# Patient Record
Sex: FEMALE | Race: AMERICAN INDIAN OR ALASKA NATIVE | NOT HISPANIC OR LATINO | Employment: PART TIME | URBAN - METROPOLITAN AREA
[De-identification: names, ages, dates, MRNs, and addresses within clinical notes are randomized per-mention and may not be internally consistent; named-entity substitution may affect disease eponyms.]

---

## 2021-05-12 ENCOUNTER — HOSPITAL ENCOUNTER (EMERGENCY)
Facility: CLINIC | Age: 26
Discharge: HOME | End: 2021-05-12
Attending: EMERGENCY MEDICINE | Admitting: EMERGENCY MEDICINE
Payer: COMMERCIAL

## 2021-05-12 VITALS
TEMPERATURE: 98.9 F | RESPIRATION RATE: 18 BRPM | BODY MASS INDEX: 27.58 KG/M2 | HEART RATE: 66 BPM | HEIGHT: 69 IN | OXYGEN SATURATION: 99 % | SYSTOLIC BLOOD PRESSURE: 122 MMHG | WEIGHT: 186.2 LBS | DIASTOLIC BLOOD PRESSURE: 75 MMHG

## 2021-05-12 DIAGNOSIS — F33.1 MODERATE EPISODE OF RECURRENT MAJOR DEPRESSIVE DISORDER (H): ICD-10-CM

## 2021-05-12 LAB
AMPHETAMINES UR QL SCN: NEGATIVE
BARBITURATES UR QL: NEGATIVE
BENZODIAZ UR QL: NEGATIVE
CANNABINOIDS UR QL SCN: NEGATIVE
COCAINE UR QL: NEGATIVE
HCG UR QL: NEGATIVE
LABORATORY COMMENT REPORT: NORMAL
OPIATES UR QL SCN: NEGATIVE
PCP UR QL SCN: NEGATIVE
SARS-COV-2 RNA RESP QL NAA+PROBE: NEGATIVE
SPECIMEN SOURCE: NORMAL

## 2021-05-12 PROCEDURE — 80307 DRUG TEST PRSMV CHEM ANLYZR: CPT | Performed by: EMERGENCY MEDICINE

## 2021-05-12 PROCEDURE — 87635 SARS-COV-2 COVID-19 AMP PRB: CPT | Performed by: EMERGENCY MEDICINE

## 2021-05-12 PROCEDURE — C9803 HOPD COVID-19 SPEC COLLECT: HCPCS

## 2021-05-12 PROCEDURE — 90791 PSYCH DIAGNOSTIC EVALUATION: CPT

## 2021-05-12 PROCEDURE — 99204 OFFICE O/P NEW MOD 45 MIN: CPT | Performed by: PSYCHIATRY & NEUROLOGY

## 2021-05-12 PROCEDURE — 81025 URINE PREGNANCY TEST: CPT | Performed by: EMERGENCY MEDICINE

## 2021-05-12 PROCEDURE — 99285 EMERGENCY DEPT VISIT HI MDM: CPT | Mod: 25

## 2021-05-12 RX ORDER — BUPROPION HYDROCHLORIDE 150 MG/1
150 TABLET ORAL EVERY MORNING
COMMUNITY
End: 2021-05-12

## 2021-05-12 RX ORDER — BUPROPION HYDROCHLORIDE 150 MG/1
300 TABLET ORAL EVERY MORNING
Refills: 0 | Status: ON HOLD
Start: 2021-05-12 | End: 2024-06-07

## 2021-05-12 RX ORDER — CITALOPRAM HYDROBROMIDE 20 MG/1
30 TABLET ORAL DAILY
COMMUNITY

## 2021-05-12 ASSESSMENT — ENCOUNTER SYMPTOMS
ABDOMINAL PAIN: 0
DYSPHORIC MOOD: 1
SLEEP DISTURBANCE: 1
SHORTNESS OF BREATH: 0
FEVER: 0

## 2021-05-12 ASSESSMENT — MIFFLIN-ST. JEOR
SCORE: 1675.29
SCORE: 1648.98

## 2021-05-12 NOTE — PLAN OF CARE
"25 year old female received from ED due to worsening depression over the last 4 days and suicidal ideation with no specific plan. Patient had spoke with her therapist this morning who she sees biweekly and it was recommended that she come to the ED for further evaluation on [facility] unit. Reports \"Every year around Mother's Day it is triggering for me because my mom passed away on Mother's day and today is the 14 th anniversary\". Patient also states, \"My  and I recently got back from [place] and I had stopped taking my medication because I am not suppose to drink on them\". Pt reports taking Citalopram 10 mg daily and Wellbutrin. Pt could not recall dosage. Currently is endorsing SI, but does not want to act on passive thoughts. Denies HI and hallucinations. Pt states, \"I just don't want to be here only because I want the thoughts to stop\". Previous MH history includes depression and anxiety since she was a teenager. Patient states, \"I feel like this about 4 times a year\". No hx of previous hospitalization. No hx of previous suicide attempt. Patient presents with flat,blunted, sad affect, and depressed in mood.     Nursing and risk assessments completed. Assessments reviewed with LMHP and physician. Video monitoring in progress, patient informed.  Admission information reviewed with patient. Patient given a tour of [facility] and instructions on using the facility. Questions regarding [facility] addressed. Pt search completed and belongings inventoried.   "

## 2021-05-12 NOTE — ED PROVIDER NOTES
"History     Chief Complaint:  Suicidal       The history is provided by the patient.     Kasey Carrera is a 25 year old female with a history of depression who presents for evaluation of suicidal ideation. The patient has been having worsening depression over the past few days as today is the 14th anniversary of her mother's passing. She has suicidal ideation, but has no plan. She shares that \"she doesn't want to feel like this\", which is what ultimately led her to present. The patient does see a therapist biweekly and spoke to her therapist today regarding her symptoms, who recommend she present. She is able to sleep, but typically wakes up during the night. She has episodes approximately four times per year where she is more depressed and has suicidal ideation. She has never done anything to harm herself. She is on a maintenance dose of citalopram and was started on Wellbutrin one month ago which she feels has helped with her energy levels. Has never tried to harm self. No medical concerns.    Review of Systems   Psychiatric/Behavioral: Positive for dysphoric mood and suicidal ideas. Negative for self-injury.   All other systems reviewed and are negative.    Allergies:  Amoxicillin   Fluoxetine  Sertraline     Medications:   Wellbutrin   Citalopram     Medical History:   Depression     Social History:  The patient was accompanied to the ED by her .  Alcohol Use: Yes  Drug Use: No   Marital Status:     Physical Exam     Patient Vitals for the past 24 hrs:   BP Temp Temp src Pulse Resp SpO2 Height Weight   05/12/21 1419 (!) 151/81 97.7  F (36.5  C) Temporal 81 18 99 % 1.753 m (5' 9\") 87.1 kg (192 lb)        Physical Exam  General: Sitting up in bed  Eyes:  The pupils are equal and round    Conjunctivae and sclerae are normal  ENT:    Wearing a mask  Neck:  Normal range of motion  CV:  Regular rate, regular rhythm     Skin warm and well perfused   Resp:  Non labored breathing on room air    No " tachypnea    No cough heard  MS:  Normal muscular tone  Skin:  No rash or acute skin lesions noted  Neuro:   Awake, alert.      Speech is normal and fluent.    Face is symmetric.     Moves all extremities equally  Psych: Good eye contact, flat affect    Emergency Department Course     Laboratory:    Asymptomatic COVID-19 (Coronavirus) PCR by Nasopharyngeal Swab: Negative     HCG Qualitative Urine: Pending   Drug abuse screen 77 urine: Pending      Emergency Department Course:    Reviewed:  I reviewed the patient's nursing notes, vitals.     Assessments:  1510 I obtained history and performed an exam of the patient, as documented above.     Disposition:  The patient was transferred to [facility].     Impression & Plan     Medical Decision Making:  Kasey Carrera is a 25 year old female who presented to the ED with suicidal ideation. Patient with passive suicidal ideation, worsened recently with anniversary of mother's death today. No attempts at harming self. [facility] Covid negative. Disposition determined by [facility] provider.    Covid-19  Kasey Carrera was evaluated during a global COVID-19 pandemic, which necessitated consideration that the patient might be at risk for infection with the SARS-CoV-2 virus that causes COVID-19.   Applicable protocols for evaluation were followed during the patient's care.   COVID-19 was considered as part of the patient's evaluation. The plan for testing is:  a test was obtained during this visit.    Diagnosis:     ICD-10-CM    1. Depression, unspecified depression type  F32.9 HCG qualitative urine     Drug abuse screen urine   2. Suicidal ideation  R45.851      Scribe Disclosure:  I, Allison Angulo, am serving as a scribe at 2:26 PM on 5/12/2021 to document services personally performed by Mamie Ramsey MD based on my observations and the provider's statements to me.      Mamie Ramsey MD  05/12/21 [id number]

## 2021-05-12 NOTE — ED PROVIDER NOTES
"ED Psychiatric [facility] Note  [facility] Emergency Department - [facility] Unit    Kasey Carrera MRN: [id number]   Age: 25 year old YOB: 1995     History     Chief Complaint   Patient presents with     Suicidal     25-year-old woman with history of depression. Reports that her mother  14 years ago on Mother's Day from cancer, so the anniversary of her death is a particularly difficult time for her. The patient reports that she is taking citalopram 20 mg and bupropion 150 mg. She has been taking the citalopram for about 6 months and started the bupropion about a month ago. She has an appointment to see her provider on Friday where they planned to discuss the dosing of the bupropion. She also plans to remove an implanted birth control device as she and her  plan to start trying to have a child. Patient reports worsening depression, poor sleep, hopelessness, thoughts of not wanting to go on. She denies thoughts of wanting to kill herself.    The history is provided by the patient. No  was used.          Review of Systems   Constitutional: Negative for fever.   Respiratory: Negative for shortness of breath.    Cardiovascular: Negative for chest pain.   Gastrointestinal: Negative for abdominal pain.   Psychiatric/Behavioral: Positive for dysphoric mood and sleep disturbance. Negative for suicidal ideas.   All other systems reviewed and are negative.        Physical Examination   BP: (!) 151/81  Pulse: 81  Temp: 97.7  F (36.5  C)  Resp: 18  Height: 175.3 cm (5' 9\")  Weight: 87.1 kg (192 lb)  SpO2: 99 %    Physical Exam  Vitals signs and nursing note reviewed.   Constitutional:       Appearance: Normal appearance. She is normal weight.   HENT:      Head: Normocephalic and atraumatic.   Eyes:      Extraocular Movements: Extraocular movements intact.   Neck:      Musculoskeletal: Normal range of motion.   Pulmonary:      Effort: Pulmonary effort is normal.   Musculoskeletal: Normal " range of motion.   Skin:     General: Skin is dry.   Neurological:      General: No focal deficit present.      Mental Status: She is alert and oriented to person, place, and time.           Psychiatric Examination   Appearance: awake, alert, adequately groomed and casually dressed  Attitude:  cooperative  Eye Contact:  poor   Mood:  depressed  Affect:  mood congruent  Speech:  clear, coherent and normal prosody  Psychomotor Behavior:  no evidence of tardive dyskinesia, dystonia, or tics and intact station, gait and muscle tone  Throught Process:  linear and goal oriented  Associations:  no loose associations  Thought Content:  passive suicidal ideation present, no auditory hallucinations present and no visual hallucinations present  Insight:  fair  Judgement:  fair  Oriented to:  time, person, and place  Attention Span and Concentration:  intact  Recent and Remote Memory:  intact    ED Course        Labs Ordered and Resulted from Time of ED Arrival Up to the Time of Departure from the ED   SARS-COV-2 (COVID-19) VIRUS RT-PCR   HCG QUALITATIVE URINE   DRUG ABUSE SCREEN 77 URINE (FL, RH, SH)       Assessments & Plan (with Medical Decision Making)   Patient presenting with worsening depression and thoughts that she doesn't want to go on. She denies active thoughts to harm herself. She has an appointment with her provider on Friday. She is taking citalopram 20 and bupropion extended release 150 mg. She sees a therapist every other week. Patient is agreeable to increase in bupropion to 300 mg daily. Per review of electronic records, patient had side effects to sertraline and fluoxetine, so will defer management of selective serotonin reuptake inhibitor to outpatient provider. Patient may take diphenhydramine as directed on package for insomnia as needed. Nursing notes reviewed.    I have reviewed the DEC assessment complete by Kathy Ingram dated 5/12/2021.    Preliminary diagnosis:  Major depressive disorder, recurrent,  moderate    --  Daniel Mendes MD   [facility] EMERGENCY DEPT  EmPATH Unit  5/12/2021        Daniel Mendes MD  05/12/21 5470

## 2021-05-12 NOTE — DISCHARGE INSTRUCTIONS
Increase bupropion to 300 mg daily. Follow up with primary provider on Friday. Increase frequency of therapy visits from every other week to weekly. Follow up with new psychiatric NP on May 19th.    If I am feeling unsafe or I am in a crisis, I will:  Contact my established care providers  Call the National Suicide Prevention Lifeline: [phone]  Go to the nearest emergency room  Call 911    Warning signs that I or other people might notice when a crisis is developing for me:     I stop functioning and struggle to focus.  I feel like a fog is over me.  I start to feel more sad and down.       Things I am able to do on my own to cope or help me feel better:     Going outside.  Walking with my dogs.  Listening to uplifting music.  Exercise.    Things that I am able to do with others to cope or help me better:    Face time or go to coffee with friends.  Talk to my , Wilson.    Changes I can make to support my mental health and wellness:     Increase my wellbutrin.  Stay on my medications; take them daily.   See Psychiatric Nurse Practitioner.  Be intentional about my mental health just like my physical health.  Go to therapy weekly.  Consider a support group.  Consider EMDR or other trauma focused therapy.  Try  Benedryl, melatonin or relaxing sounds for sleep     People in my life that I can ask for help:     Wilson, friends and family.    Your [facility] has a mental health crisis team you can call 24/7:   [facility] Crisis Line Number: [phone]    Follow-Up Plans and Providers:     [facility]:  May 19, 2021 at 10:30 am  [phone]
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
## 2021-05-13 NOTE — PROGRESS NOTES
Patient is pleasant, calm and cooperative. Patient agreeable to discharge plan. Discharge instructions reviewed with patient including follow-up care plan. Medications: Wellbutrin increased to 300 mg daily. Reviewed safety plan and outpatient resources. Denies SI and HI. All belongings that were brought into the hospital have been returned to patient. Escorted off the unit at 1925 accompanied by [facility] staff. Discharged to home via .

## 2023-10-03 ENCOUNTER — NURSE TRIAGE (OUTPATIENT)
Dept: NURSING | Facility: CLINIC | Age: 28
End: 2023-10-03
Payer: COMMERCIAL

## 2023-10-03 NOTE — TELEPHONE ENCOUNTER
Pt calling with concerns about;    HSG procedure done on 9/25/23 ([facility])  Pt reports she is not feeling confident with facility that did her procedure and wants to be triaged today.  Still having cramping 4/10 that is constant today and uncomfortable  Pt is on day 19 of menstrual cycle and does not believe she is pregnant.  Still having minimal amount of drainage from procedure  Bloating in lower abdominal  Some nausea  Also some diarrhea for last few days   Pt states she is worried about infection of procedure but 'does not want to go back to [facility]'    Denies;  Fever  Vomiting  Severe abdominal pain    According to the protocol, patient should go to  (no PCP for 2LT).  Care advice given. Patient verbalizes understanding and agrees with plan of care.     Carline Wilder RN, Nurse Advisor 10:40 AM 10/3/2023  Reason for Disposition   Abdominal cramps unrelated to menstrual period   MILD TO MODERATE constant pain lasting > 2 hours    Additional Information   Negative: Sounds like a life-threatening emergency to the triager   Negative: Passed out (i.e., fainted, collapsed and was not responding)   Negative: Shock suspected (e.g., cold/pale/clammy skin, too weak to stand, low BP, rapid pulse)   Negative: Sounds like a life-threatening emergency to the triager   Negative: Followed an abdomen (stomach) injury   Negative: Chest pain   Negative: Abdominal pain and pregnant < 20 weeks   Negative: Abdominal pain and pregnant 20 or more weeks   Negative: Pain is mainly in upper abdomen (if needed ask: 'is it mainly above the belly button?')   Negative: Abdomen bloating or swelling are main symptoms   Negative: SEVERE abdominal pain (e.g., excruciating)   Negative: Vomiting red blood or black (coffee ground) material   Negative: Blood in bowel movements  (Exception: Blood on surface of BM with constipation.)   Negative: Black or tarry bowel movements  (Exception: Chronic-unchanged black-grey BMs AND is taking  iron pills or Pepto-Bismol.)    Protocols used: Abdominal Pain - Menstrual Cramps-A-OH, Abdominal Pain - Female-A-OH

## 2023-10-23 ENCOUNTER — TELEPHONE (OUTPATIENT)
Dept: OBGYN | Facility: CLINIC | Age: 28
End: 2023-10-23
Payer: COMMERCIAL

## 2023-10-23 NOTE — TELEPHONE ENCOUNTER
Reason for call:  Other   Patient called regarding (reason for call): call back  Additional comments: Can you please try to find a sooner appt with DO -her lmp was 9/15 and she is scheduled 12/6    Phone number to reach patient:  Cell number on file:    Telephone Information:   Mobile [phone]       Best Time: soon    Can we leave a detailed message on this number?  Not Applicable    Travel screening: Not Applicable

## 2023-10-30 ENCOUNTER — VIRTUAL VISIT (OUTPATIENT)
Dept: OBGYN | Facility: CLINIC | Age: 28
End: 2023-10-30
Payer: COMMERCIAL

## 2023-10-30 DIAGNOSIS — O09.91 SUPERVISION OF HIGH RISK PREGNANCY IN FIRST TRIMESTER: Primary | ICD-10-CM

## 2023-10-30 DIAGNOSIS — O36.80X0 PREGNANCY WITH INCONCLUSIVE FETAL VIABILITY: ICD-10-CM

## 2023-10-30 DIAGNOSIS — Z13.79 GENETIC SCREENING: ICD-10-CM

## 2023-10-30 PROBLEM — O09.90 SUPERVISION OF HIGH-RISK PREGNANCY: Status: ACTIVE | Noted: 2023-10-30

## 2023-10-30 PROCEDURE — 99207 PR NO CHARGE NURSE ONLY: CPT

## 2023-10-30 RX ORDER — TRAZODONE HYDROCHLORIDE 50 MG/1
50 TABLET, FILM COATED ORAL PRN
Status: ON HOLD | COMMUNITY
End: 2024-06-07

## 2023-10-30 NOTE — PATIENT INSTRUCTIONS
Learning About Pregnancy  Your Care Instructions     Your health in the early weeks of your pregnancy is particularly important for your baby's health. Take good care of yourself. Anything you do that harms your body can also harm your baby.  Make sure to go to all of your doctor appointments. Regular checkups will help keep you and your baby healthy.  How can you care for yourself at home?  Diet    Eat a balanced diet. Make sure your diet includes plenty of beans, peas, and leafy green vegetables.     Do not skip meals or go for many hours without eating. If you are nauseated, try to eat a small, healthy snack every 2 to 3 hours.     Do not eat fish that has a high level of mercury, such as shark, swordfish, or mackerel. Do not eat more than one can of tuna each week.     Drink plenty of fluids. If you have kidney, heart, or liver disease and have to limit fluids, talk with your doctor before you increase the amount of fluids you drink.     Cut down on caffeine, such as coffee, tea, and cola.     Do not drink alcohol, such as beer, wine, or hard liquor.     Take a multivitamin that contains at least 400 micrograms (mcg) of folic acid to help prevent birth defects. Fortified cereal and whole wheat bread are good additional sources of folic acid.     Increase the calcium in your diet. Try to drink a quart of skim milk each day. You may also take calcium supplements and choose foods such as cheese and yogurt.   Lifestyle    Make sure you go to your follow-up appointments.     Get plenty of rest. You may be unusually tired while you are pregnant.     Get at least 30 minutes of exercise on most days of the week. Walking is a good choice. If you have not exercised in the past, start out slowly. Take several short walks each day.     Do not smoke. If you need help quitting, talk to your doctor about stop-smoking programs. These can increase your chances of quitting for good.     Do not touch cat feces or litter boxes.  Also, wash your hands after you handle raw meat, and fully cook all meat before you eat it. Wear gloves when you work in the yard or garden, and wash your hands well when you are done. Cat feces, raw or undercooked meat, and contaminated dirt can cause an infection that may harm your baby or lead to a miscarriage.     Avoid things that can make your body too hot and may be harmful to your baby, such as a hot tub or sauna. Or talk with your doctor before doing anything that raises your body temperature. Your doctor can tell you if it's safe.     Avoid chemical fumes, paint fumes, or poisons.     Do not use illegal drugs, marijuana, or alcohol.   Medicines    Review all of your medicines with your doctor. Some of your routine medicines may need to be changed to protect your baby.     Use acetaminophen (Tylenol) to relieve minor problems, such as a mild headache or backache or a mild fever with cold symptoms. Do not use nonsteroidal anti-inflammatory drugs (NSAIDs), such as ibuprofen (Advil, Motrin) or naproxen (Aleve), unless your doctor says it is okay.     Do not take two or more pain medicines at the same time unless the doctor told you to. Many pain medicines have acetaminophen, which is Tylenol. Too much acetaminophen (Tylenol) can be harmful.     Take your medicines exactly as prescribed. Call your doctor if you think you are having a problem with your medicine.   To manage morning sickness    If you feel sick when you first wake up, try eating a small snack (such as crackers) before you get out of bed. Allow some time to digest the snack, and then get out of bed slowly.     Do not skip meals or go for long periods without eating. An empty stomach can make nausea worse.     Eat small, frequent meals instead of three large meals each day.     Drink plenty of fluids.     Eat foods that are high in protein but low in fat.     If you are taking iron supplements, ask your doctor if they are necessary. Iron can make  "nausea worse.     Avoid any smells, such as coffee, that make you feel sick.     Get lots of rest. Morning sickness may be worse when you are tired.   Follow-up care is a key part of your treatment and safety. Be sure to make and go to all appointments, and call your doctor if you are having problems. It's also a good idea to know your test results and keep a list of the medicines you take.  Where can you learn more?  Go to https://www.Wanelo.net/patiented  Enter E868 in the search box to learn more about \"Learning About Pregnancy.\"  Current as of: November 9, 2022               Content Version: 13.7    8226-6546 [employer].   Care instructions adapted under license by your healthcare professional. If you have questions about a medical condition or this instruction, always ask your healthcare professional. [employer] disclaims any warranty or liability for your use of this information.      Weeks 6 to 10 of Your Pregnancy: Care Instructions  During these weeks of pregnancy, your body goes through many changes. You may start to feel different, both in your body and your emotions. Each pregnancy is different, so there's no \"right\" way to feel. These early weeks are a time to make healthy choices for you and your pregnancy.    Take a daily prenatal vitamin. Choose one with folic acid in it.   Avoid alcohol, tobacco, and drugs (including marijuana). If you need help quitting, talk to your doctor.     Drink plenty of liquids.  Be sure to drink enough water. And limit sodas, other sweetened drinks, and caffeine.     Choose foods that are good sources of calcium, iron, and folate.  You can try dairy products, dark leafy greens, fortified orange juice and cereals, almonds, broccoli, dried fruit, and beans.     Avoid foods that may be harmful.  Don't eat raw meat, deli meat, raw seafood, or raw eggs. Avoid soft cheese and unpasteurized dairy, like Brie and blue cheese. And don't eat fish that " "contains a lot of mercury, like shark and swordfish.     Don't touch lolly litter or cat poop.  They can cause an infection that could be harmful during pregnancy.     Avoid things that can make your body too hot.  For example, avoid hot tubs and saunas.     Soothe morning sickness.  Try eating 5 or 6 small meals a day, getting some fresh air, or using ashley to control symptoms.     Ask your doctor about flu and COVID-19 shots.  Getting them can help protect against infection.   Follow-up care is a key part of your treatment and safety. Be sure to make and go to all appointments, and call your doctor if you are having problems. It's also a good idea to know your test results and keep a list of the medicines you take.  Where can you learn more?  Go to https://www.wikifolio.Medical Connections/patiented  Enter G112 in the search box to learn more about \"Weeks 6 to 10 of Your Pregnancy: Care Instructions.\"  Current as of: November 9, 2022               Content Version: 13.7 2006-2023 [employer].   Care instructions adapted under license by your healthcare professional. If you have questions about a medical condition or this instruction, always ask your healthcare professional. [employer] disclaims any warranty or liability for your use of this information.         Managing Morning Sickness (01:55)  Your health professional recommends that you watch this short online health video.  Learn tips for dealing with morning sickness, no matter what time of day you have it.  Purpose:  Gives tips for managing morning sickness, including eating small low-fat meals and avoiding caffeine and spicy food.  Goal:  The user will learn tips for dealing with morning sickness during pregnancy.     How to watch the video    Scan the QR code   OR Visit the website    [email or web address]   Current as of: November 9, 2022               Content Version: 13.7 2006-2023 [employer].   Care instructions " adapted under license by your healthcare professional. If you have questions about a medical condition or this instruction, always ask your healthcare professional. Healthwise, [employer] disclaims any warranty or liability for your use of this information.      Pregnancy and Heartburn: Care Instructions  Overview     Heartburn is a common problem during pregnancy.  Heartburn happens when stomach acid backs up into the tube that carries food to the stomach. This tube is called the esophagus. Early in pregnancy, heartburn is caused by hormone changes that slow down digestion. Later on, it's also caused by the large uterus pushing up on the stomach.  Even though you can't fix the cause, there are things you can do to get relief. Treating heartburn during pregnancy focuses first on making lifestyle changes, like changing what and how you eat, and on taking medicines.  Heartburn usually improves or goes away after childbirth.  Follow-up care is a key part of your treatment and safety. Be sure to make and go to all appointments, and call your doctor if you are having problems. It's also a good idea to know your test results and keep a list of the medicines you take.  How can you care for yourself at home?  Eat small, frequent meals.  Avoid foods that make your symptoms worse, such as chocolate, peppermint, and spicy foods. Avoid drinks with caffeine, such as coffee, tea, and sodas.  Avoid bending over or lying down after meals.  Take a short walk after you eat.  If heartburn is a problem at night, do not eat for 2 hours before bedtime.  Take antacids like Mylanta, Maalox, Rolaids, or Tums. Do not take antacids that have sodium bicarbonate, magnesium trisilicate, or aspirin. Be careful when you take over-the-counter antacid medicines. Many of these medicines have aspirin in them. While you are pregnant, do not take aspirin or medicines that contain aspirin unless your doctor says it is okay.  If you're not getting  "relief, talk to your doctor. You may be able to take a stronger acid-reducing medicine.  When should you call for help?   Call your doctor now or seek immediate medical care if:    You have new or worse belly pain.     You are vomiting.   Watch closely for changes in your health, and be sure to contact your doctor if:    You have new or worse symptoms of reflux.     You are losing weight.     You have trouble or pain swallowing.     You do not get better as expected.   Where can you learn more?  Go to https://www.tab ticketbroker.net/patiented  Enter U946 in the search box to learn more about \"Pregnancy and Heartburn: Care Instructions.\"  Current as of: November 9, 2022               Content Version: 13.7 2006-2023 [employer].   Care instructions adapted under license by your healthcare professional. If you have questions about a medical condition or this instruction, always ask your healthcare professional. [employer] disclaims any warranty or liability for your use of this information.      Constipation: Care Instructions  Overview     Constipation means that you have a hard time passing stools (bowel movements). People pass stools from 3 times a day to once every 3 days. What is normal for you may be different. Constipation may occur with pain in the rectum and cramping. The pain may get worse when you try to pass stools. Sometimes there are small amounts of bright red blood on toilet paper or the surface of stools. This is because of enlarged veins near the rectum (hemorrhoids).  A few changes in your diet and lifestyle may help you avoid ongoing constipation. Your doctor may also prescribe medicine to help loosen your stool.  Some medicines can cause constipation. These include pain medicines and antidepressants. Tell your doctor about all the medicines you take. Your doctor may want to make a medicine change to ease your symptoms.  Follow-up care is a key part of your treatment and " "safety. Be sure to make and go to all appointments, and call your doctor if you are having problems. It's also a good idea to know your test results and keep a list of the medicines you take.  How can you care for yourself at home?  Drink plenty of fluids. If you have kidney, heart, or liver disease and have to limit fluids, talk with your doctor before you increase the amount of fluids you drink.  Include high-fiber foods in your diet each day. These include fruits, vegetables, beans, and whole grains.  Get at least 30 minutes of exercise on most days of the week. Walking is a good choice. You also may want to do other activities, such as running, swimming, cycling, or playing tennis or team sports.  Take a fiber supplement, such as Citrucel or Metamucil, every day. Read and follow all instructions on the label.  Schedule time each day for a bowel movement. A daily routine may help. Take your time having a bowel movement, but don't sit for more than 10 minutes at a time. And don't strain too much.  Support your feet with a small step stool when you sit on the toilet. This helps flex your hips and places your pelvis in a squatting position.  Your doctor may recommend an over-the-counter laxative to relieve your constipation. Examples are Milk of Magnesia and MiraLax. Read and follow all instructions on the label. Do not use laxatives on a long-term basis.  When should you call for help?   Call your doctor now or seek immediate medical care if:    You have new or worse belly pain.     You have new or worse nausea or vomiting.     You have blood in your stools.   Watch closely for changes in your health, and be sure to contact your doctor if:    Your constipation is getting worse.     You do not get better as expected.   Where can you learn more?  Go to https://www.healthwise.net/patiented  Enter P343 in the search box to learn more about \"Constipation: Care Instructions.\"  Current as of: March 22, " "2023               Content Version: 13.7 2006-2023 [employer].   Care instructions adapted under license by your healthcare professional. If you have questions about a medical condition or this instruction, always ask your healthcare professional. [employer] disclaims any warranty or liability for your use of this information.      Learning About High-Iron Foods  What foods are high in iron?     The foods you eat contain nutrients, such as vitamins and minerals. Iron is a nutrient. Your body needs the right amount to stay healthy and work as it should. You can use the list below to help you make choices about which foods to eat.  Here are some foods that contain iron. They have 1 to 2 milligrams of iron per serving.  Fruits  Figs (dried), 5 figs  Vegetables  Asparagus (canned), 6 rodriguez  Earnestine, beet, Swiss chard, or turnip greens, 1 cup  Dried peas, cooked,   cup  Seaweed, spirulina (dried),   cup  Spinach, (cooked)   cup or (raw) 1 cup  Grains  Cereals, fortified with iron, 1 cup  Grits (instant, cooked), fortified with iron,   cup  Meats and other protein foods  Beans (kidney, lima, navy, white), canned or cooked,   cup  Beef or lamb, 3 oz  Chicken giblets, 3 oz  Chickpeas (garbanzo beans),   cup  Liver of beef, lamb, or pork, 3 oz  Oysters (cooked), 3 oz  Sardines (canned), 3 oz  Soybeans (boiled),   cup  Tofu (firm),   cup  Work with your doctor to find out how much of this nutrient you need. Depending on your health, you may need more or less of it in your diet.  Where can you learn more?  Go to https://www.healthDelphix.net/patiented  Enter R005 in the search box to learn more about \"Learning About High-Iron Foods.\"  Current as of: March 1, 2023               Content Version: 13.7 2006-2023 [employer].   Care instructions adapted under license by your healthcare professional. If you have questions about a medical condition or this instruction, always ask your " "healthcare professional. [employer], [facility] disclaims any warranty or liability for your use of this information.      Rh Antibodies Screening During Pregnancy: About This Test  What is it?     The Rh antibodies screening test is a blood test. It checks your blood for Rh antibodies. If you have Rh-negative blood and have been exposed to Rh-positive blood, your immune system may make antibodies to attack the Rh-positive blood. When a pregnant woman has these antibodies, it is called Rh sensitization.  Why is this test done?  The Rh antibodies screening test is done during pregnancy to find out if your baby is at risk for Rh disease. This can happen if you have Rh-negative blood and your baby has Rh-positive blood. If your Rh-negative blood mixes with Rh-positive blood, your immune system will make antibodies to attack the Rh-positive blood.  During pregnancy, these antibodies could attach to the baby's red blood cells. This can cause your baby to have serious health problems. The results of this test will help your doctor know how to best care for you and your baby during your pregnancy.  How do you prepare for the test?  In general, there's nothing you have to do before this test, unless your doctor tells you to.  How is the test done?  A health professional uses a needle to take a blood sample, usually from the arm.  What happens after the test?  You will probably be able to go home right away. It depends on the reason for the test.  You can go back to your usual activities right away.  Follow-up care is a key part of your treatment and safety. Be sure to make and go to all appointments, and call your doctor if you are having problems. It's also a good idea to keep a list of the medicines you take. Ask your doctor when you can expect to have your test results.  Where can you learn more?  Go to https://www.Filmijob.net/patiented  Enter P722 in the search box to learn more about \"Rh Antibodies Screening " "During Pregnancy: About This Test.\"  Current as of: 2022               Content Version: 13.7    0560-1258 [employer].   Care instructions adapted under license by your healthcare professional. If you have questions about a medical condition or this instruction, always ask your healthcare professional. [employer] disclaims any warranty or liability for your use of this information.      Learning About Preventing Rh Disease  What is Rh disease?     Rh disease can be a serious problem in pregnancy. It happens when substances called antibodies in the mother's blood cause red blood cells in her baby's blood to be destroyed. This can occur when the blood types of a mother and her baby do not match.  All blood has an Rh factor. This is what makes a blood type positive or negative. When you are Rh-negative, your baby may be Rh-negative or Rh-positive. If your baby has Rh-positive blood and it mixes with yours, your body will make antibodies. This is called Rh sensitization.  Most of the time, this is not a problem in a first pregnancy. But in future pregnancies, it could cause Rh disease.  A  with Rh disease has mild anemia and may have jaundice. In severe cases, anemia, jaundice, and swelling can be very dangerous or fatal. Some babies need to be delivered early. Some need special care in the NICU. A very sick baby will need a blood transfusion before or after birth.  Fortunately, Rh sensitization is usually easy to prevent.  That's why it's important to get your Rh status checked in your first trimester. It doesn't cause any warning signs. A blood test is the only way to know if you are Rh-sensitive or are at risk for it.  How can you prevent Rh disease?  If you are Rh-negative, your doctor gives you an Rh immune globulin shot (such as RhoGAM). It helps prevent your body from making the antibodies that attack your baby's red blood cells.  Timing is important. You need the " "shot at certain times during your pregnancy. And you need one anytime there is a chance that your baby's blood might mix with yours. That can happen with certain prenatal tests or when you have pregnancy bleeding, such as:  Right after any pregnancy loss, amniocentesis, or CVS testing.  After turning of a breech baby.  Before and maybe after childbirth. Your doctor gives you a shot around week 28. If your  is Rh-positive, you will have another shot.  Follow-up care is a key part of your treatment and safety. Be sure to make and go to all appointments, and call your doctor if you are having problems. It's also a good idea to know your test results and keep a list of the medicines you take.  Where can you learn more?  Go to https://www.SwipeGood.ApplyKit/patiented  Enter W177 in the search box to learn more about \"Learning About Preventing Rh Disease.\"  Current as of: 2022               Content Version: 13.7    7156-6285 [employer].   Care instructions adapted under license by your healthcare professional. If you have questions about a medical condition or this instruction, always ask your healthcare professional. [employer] disclaims any warranty or liability for your use of this information.      Learning About Rh Immunoglobulin Shots  Introduction     An Rh immunoglobulin shot is given to pregnant women who have Rh-negative blood.  You may have Rh-negative blood, and your baby may have Rh-positive blood. If the two types of blood mix, your body will make antibodies. This is called Rh sensitization. Most of the time, this is not a problem the first time you're pregnant. But it could cause problems in future pregnancies.  This shot keeps your body from making the antibodies. You get the shot around 28 weeks of pregnancy. After the birth, your baby's blood is tested. If the blood is Rh positive, you will get another shot. You may also get the shot if you have vaginal bleeding " "while you are pregnant or if you have a miscarriage. These shots protect future pregnancies.  Women with Rh negative blood will need this shot each time they get pregnant.  Example  Rh immunoglobulin (HypRho-D, MICRhoGAM, and RhoGAM)  Possible side effects  Rare side effects may include:  Some mild pain where you got the shot.  A slight fever.  An allergic reaction.  You may have other side effects not listed here. Check the information that comes with your medicine.  What to know about taking this medicine  You may need more than one shot. You may need the shot again:  After amniocentesis, fetal blood sampling, or chorionic villus sampling tests.  If you have bleeding in your second or third trimester.  After turning of a breech baby.  After an injury to the belly while you are pregnant.  After a miscarriage or an .  Before or right after treatment for an ectopic or a partial molar pregnancy.  Tell your doctor if you have any allergies or have had a bad response to medicines in the past.  If you get this shot within 3 months of getting a live-virus vaccine, the vaccine may not work. Your doctor will tell you if you need more vaccine.  Check with your doctor or pharmacist before you use any other medicines. This includes over-the-counter medicines. Make sure your doctor knows all of the medicines, vitamins, herbs, and supplements you take. Taking some medicines at the same time can cause problems.  Where can you learn more?  Go to https://www.Go Vocab.net/patiented  Enter V615 in the search box to learn more about \"Learning About Rh Immunoglobulin Shots.\"  Current as of: 2022               Content Version: 13.7    5836-8200 [employer].   Care instructions adapted under license by your healthcare professional. If you have questions about a medical condition or this instruction, always ask your healthcare professional. [employer] disclaims any warranty or liability for " your use of this information.      Rubella (Japanese Measles): Care Instructions  Overview  Rubella, also called Japanese measles or 3-day measles, is a disease caused by a virus. It spreads by coughs, sneezes, and close contact. Rubella usually is mild and does not cause long-term problems. But if you are pregnant and get it, you can give the disease to your unborn baby. This can cause serious birth defects.  While you have rubella, you may get a rash and a mild fever, and the lymph glands in your neck may swell. Older children often have a fever, eye pain, a sore throat, and body aches. You can relieve most symptoms with care at home. Avoid being around others, especially pregnant people, until your rash has been gone for at least 4 days. People who have not had this disease before or have not had the vaccine have the greatest chance of getting the virus.  Follow-up care is a key part of your treatment and safety. Be sure to make and go to all appointments, and call your doctor if you are having problems. It's also a good idea to know your test results and keep a list of the medicines you take.  How can you care for yourself at home?  Drink plenty of fluids. If you have kidney, heart, or liver disease and have to limit fluids, talk with your doctor before you increase the amount of fluids you drink.  Get plenty of rest to help your body heal.  Take an over-the-counter pain medicine, such as acetaminophen (Tylenol), ibuprofen (Advil, Motrin), or naproxen (Aleve), to reduce fever and discomfort. Read and follow all instructions on the label. Do not give aspirin to anyone younger than 20. It has been linked to Reye syndrome, a serious illness.  Do not take two or more pain medicines at the same time unless the doctor told you to. Many pain medicines have acetaminophen, which is Tylenol. Too much acetaminophen (Tylenol) can be harmful.  Try not to scratch the rash. Put cold, wet cloths on the rash to reduce itching.  Do  "not smoke. Smoking can make your symptoms worse. If you need help quitting, talk to your doctor about stop-smoking programs and medicines. These can increase your chances of quitting for good.  Avoid contact with people who have never had rubella and who have not been immunized.  When should you call for help?   Call your doctor now or seek immediate medical care if:    You have a fever with a stiff neck or a severe headache.     You are sensitive to light or feel very sleepy or confused.   Watch closely for changes in your health, and be sure to contact your doctor if:    You do not get better as expected.   Where can you learn more?  Go to https://www.Kipo.net/patiented  Enter B812 in the search box to learn more about \"Rubella (Tajik Measles): Care Instructions.\"  Current as of: 2022               Content Version: 13.7    4131-8557 [employer].   Care instructions adapted under license by your healthcare professional. If you have questions about a medical condition or this instruction, always ask your healthcare professional. [employer] disclaims any warranty or liability for your use of this information.      Gonorrhea and Chlamydia: About These Tests  What is it?  These tests use a sample of urine or other body fluid to look for the bacteria that cause these sexually transmitted infections (STIs). The fluid sample can come from the cervix, vagina, rectum, throat, or eyes.  Why is this test done?  These tests may be done to:  Find out if symptoms are caused by gonorrhea or chlamydia.  Check people who are at high risk of being infected with gonorrhea or chlamydia.  Retest people several months after they have been treated for gonorrhea or chlamydia.  Check for infection in your  if you had a gonorrhea or chlamydia infection at the time of delivery.  How can you prepare for the test?  If you are going to have a urine test, do not urinate for at least 1 hour " "before the test.  If you think you may have chlamydia or gonorrhea, don't have sexual intercourse until you get your test results. And you may want to have tests for other STIs, such as HIV.  How is the test done?  For a direct sample, a swab is used to collect body fluid from the cervix, vagina, rectum, throat, or eyes. Your doctor may collect the sample. Or you may be given instructions on how to collect your own sample.  For a urine sample, you will collect the urine that comes out when you first start to urinate. Don't wipe the genital area clean before you urinate.  How long does the test take?  The test will take a few minutes.  What happens after the test?  You will be able to go home right away.  You can go back to your usual activities right away.  If you do have an infection, don't have sexual intercourse for 7 days after you start treatment. And your sex partner(s) should also be treated.  Follow-up care is a key part of your treatment and safety. Be sure to make and go to all appointments, and call your doctor if you are having problems. It's also a good idea to keep a list of the medicines you take. Ask your doctor when you can expect to have your test results.  Where can you learn more?  Go to https://www.Anergis.net/patiented  Enter K976 in the search box to learn more about \"Gonorrhea and Chlamydia: About These Tests.\"  Current as of: August 2, 2022               Content Version: 13.7    2745-5453 [employer].   Care instructions adapted under license by your healthcare professional. If you have questions about a medical condition or this instruction, always ask your healthcare professional. [employer] disclaims any warranty or liability for your use of this information.      Trichomoniasis: About This Test  What is it?     This test uses a sample of urine or other body fluid to look for the tiny parasite that causes trichomoniasis (also called trich). The fluid sample " can come from the vagina, cervix, or urethra. Your doctor may choose to use one or more of many available tests.  Why is it done?  A trich test may be done to:  Find out if symptoms are caused by trich.  Check people who are at high risk for being infected with trich.  Check after treatment to make sure that the infection is gone.  How do you prepare for the test?  If you are going to have a urine test, do not urinate for at least 1 hour before the test.  How is the test done?  For a direct sample, a swab is used to collect body fluid from the cervix, vagina, or urethra. Your doctor may collect the sample. Or you may be given instructions on how to collect your own sample.  For a urine sample, you will collect the urine that comes out when you first start to urinate. Don't wipe the area clean before you urinate.  How long does the test take?  It will take a few minutes to collect a sample.  What happens after the test?  You can go home right away.  You can go back to your usual activities right away.  You may get the test results the same day or several days later. It depends on the test used.  If you do have an infection, don't have sexual intercourse for 7 days after you start treatment. Your sex partner(s) should also be treated.  Follow-up care is a key part of your treatment and safety. Be sure to make and go to all appointments, and call your doctor if you are having problems. Ask your doctor when you can expect to have your test results.  Current as of: August 2, 2022               Content Version: 13.7    1337-5601 [employer].   Care instructions adapted under license by your healthcare professional. If you have questions about a medical condition or this instruction, always ask your healthcare professional. [employer] disclaims any warranty or liability for your use of this information.      HIV Testing: Care Instructions  Overview     You can get tested for the human  "immunodeficiency virus (HIV). This test checks for HIV antibodies and antigens in your blood. If they are found, the test is positive.  If HIV antibodies or antigens are not found, you may need a repeat test to be sure the results are correct. Or your doctor may want to do a different test.  Follow-up care is a key part of your treatment and safety. Be sure to make and go to all appointments, and call your doctor if you are having problems. It's also a good idea to know your test results and keep a list of the medicines you take.  What do the results mean?  Normal result  A normal result means that no HIV antibodies or antigens were found in your blood. And if you had a test that checked for HIV RNA or DNA, none was found. Normal results are called negative.  You may need more testing to be sure the test results are correct.  Uncertain result  Test results don't clearly show whether you have an HIV infection. This is usually called an indeterminate result. This may happen before HIV antibodies or antigens develop. Or it may happen when some other type of antibody or antigen interferes with the results. If this occurs, you will probably have another test right away.  Abnormal result  An abnormal result means that you have HIV antibodies or antigens in your blood. These results are called positive.  A positive test will be confirmed by another type of test. This is because some tests can cause false-positive results. No one is considered HIV-positive until the result is confirmed by a test that shows HIV RNA or DNA in the person's blood.  If your test result is positive, your doctor will talk to you about starting treatment.  Where can you learn more?  Go to https://www.Bridgevine.net/patiented  Enter T792 in the search box to learn more about \"HIV Testing: Care Instructions.\"  Current as of: October 31, 2022               Content Version: 13.7    7735-4702 [employer].   Care instructions adapted under " license by your healthcare professional. If you have questions about a medical condition or this instruction, always ask your healthcare professional. [employer] disclaims any warranty or liability for your use of this information.      Hepatitis C Virus Tests: About These Tests  What are they?     Hepatitis C virus tests are blood tests that check for substances in the blood that show whether you have hepatitis C now or had it in the past. The tests can also tell you what type of hepatitis C you have and how severe the disease is. This can help your doctor with treatment.  If the tests show that you have long-term hepatitis C, you need to take steps to prevent spreading the disease.  Why are these tests done?  You may need these tests if:  You have symptoms of hepatitis.  You may have been exposed to the virus. You are more likely to have been exposed to the virus if you inject drugs or are exposed to body fluids (such as if you are a health care worker).  You've had other tests that show you have liver problems.  You are 18 to 79 years old.  You have an HIV infection.  The tests also are done to help your doctor decide about your treatment and see how well it works.  How do you prepare for the test?  In general, there's nothing you have to do before this test, unless your doctor tells you to.  How is the test done?  A health professional uses a needle to take a blood sample, usually from the arm.  What happens after these tests?  You will probably be able to go home right away.  You can go back to your usual activities right away.  Follow-up care is a key part of your treatment and safety. Be sure to make and go to all appointments, and call your doctor if you are having problems. It's also a good idea to keep a list of the medicines you take. Ask your doctor when you can expect to have your test results.  Where can you learn more?  Go to https://www.Euclid.net/patiented  Enter W551 in the search  "box to learn more about \"Hepatitis C Virus Tests: About These Tests.\"  Current as of: October 31, 2022               Content Version: 13.7    6823-6504 [employer].   Care instructions adapted under license by your healthcare professional. If you have questions about a medical condition or this instruction, always ask your healthcare professional. [employer] disclaims any warranty or liability for your use of this information.      Learning About Fetal Ultrasound Results  What is a fetal ultrasound?     Fetal ultrasound is a test that lets your doctor see an image of your baby. Your doctor learns information about your baby from this picture. You may find out, for example, if you are having a boy or a girl. But the main reason you have this test is to get information about your baby's health.  (You may hear your baby called a fetus. This is a common medical term for a baby that's growing in the mother's uterus.)  What kind of information can you learn from this test?  The findings of an ultrasound fall into two categories, normal and abnormal.  Normal  The fetus is the right size for its age.  The placenta is the expected size and does not cover the cervix.  There is enough amniotic fluid in the uterus.  No birth defects can be seen.  Abnormal  The fetus is small or large for its age.  The placenta covers the cervix.  There is too much or too little amniotic fluid in the uterus.  The fetus may have a birth defect.  What does an abnormal result mean?  Abnormal seems to imply that something is wrong with your baby. But what it means is that the test has shown something the doctor wants to take a closer look at.  And that's what happens next. Your doctor will talk to you about what further test or tests you may need.  What do the results mean?  Some of the things your doctor may see on an abnormal ultrasound include:  Echogenic bowel.  The bowel looks very bright on the screen. This could " mean that there's blood in the bowel. Or it could mean that something is blocking the small bowel.  Increased nuchal translucency.  The ultrasound measures the thickness at the back of the baby's neck. An increase in thickness is sometimes an early sign of Down syndrome.  Increased or decreased amniotic fluid.  The doctor will look for a reason for the level of amniotic fluid and will watch the pregnancy closely as it progresses.  Large ventricles.  Ventricles in the brain look larger than they should. Your doctor may take a closer look at the brain.  Renal pyelectasis/hydronephrosis.  The ultrasound measures the fluid around the kidney. If there is more fluid than expected, there is a chance of urinary tract or kidney problems.  Short long bones.  The ultrasound measures certain arm and leg bones. A long bone (humerus or femur) that is shorter than average could be a sign of Down syndrome.  Subchorionic hemorrhage.  An ultrasound can show bleeding under one of the membranes that surrounds the fetus. Some women don't have symptoms of bleeding. The ultrasound can find this problem when women are not bleeding from their vagina. Women who have this condition have a slightly higher chance of miscarriage.  What do you do now?  Take a deep breath, and let it out. Keep in mind that an abnormal finding on an ultrasound, after it's coupled with more information, may:  Turn out to be nothing.  Turn out to be something mild that won't affect the baby.  Turn out to be something more serious. But if this happens, early diagnosis helps you and your doctor plan treatment options sooner rather than later.  Your medical team is there for you. So are your family and friends. Ask questions, and get the help and support you need.  Follow-up care is a key part of your treatment and safety. Be sure to make and go to all appointments, and call your doctor if you are having problems. It's also a good idea to know your test results and keep  "a list of the medicines you take.  Where can you learn more?  Go to https://www.Blownaway.net/patiented  Enter K451 in the search box to learn more about \"Learning About Fetal Ultrasound Results.\"  Current as of: November 9, 2022               Content Version: 13.7    8345-4156 [employer].   Care instructions adapted under license by your healthcare professional. If you have questions about a medical condition or this instruction, always ask your healthcare professional. [employer] disclaims any warranty or liability for your use of this information.      Learning About Prenatal Visits  Your Care Instructions     Regular prenatal visits are very important during any pregnancy. These quick office visits may seem simple and routine. But they can help you and your baby stay healthy. Your doctor is watching for problems that can only be found by regularly checking you and your baby. The visits also give you and your doctor time to build a good relationship.  Many women have prenatal visits every 4 to 6 weeks until week 28 of pregnancy. Then the visits become more frequent. This is often every 2 to 3 weeks through week 36 of pregnancy. In the final month of pregnancy, you likely will see your doctor every week. You may have a different schedule if you have a medical problem or are a teen.  At different times in your pregnancy, you will have exams and tests. Some are routine. Others are done only when there is a chance of a problem. Everything healthy you do for your body helps your growing baby. Rest when you need it. Eat well, drink plenty of water, and exercise regularly.  What happens during a prenatal visit?  You will have blood pressure checks, along with urine tests. You also may have blood tests. If you need to go to the bathroom while waiting for the doctor, tell the nurse. He or she will give you a sample cup so your urine can be tested.  You will be weighed and have your belly " measured.  Your doctor may listen to your baby's heartbeat with a special stethoscope.  In your second trimester, your doctor will check your blood sugar (glucose tolerance test) for diabetes that can occur during pregnancy. This is gestational diabetes, which can harm your baby.  You will have tests to check for infections that could harm your . These include group B streptococcus and hepatitis B.  Your doctor may do ultrasounds to check for problems. This also checks your baby's position. An ultrasound uses sound waves to produce a picture of your baby.  You may have other tests at any time during your pregnancy.  Use your visits to discuss with your doctor any concerns you have.  How can you care for yourself at home?  Get plenty of rest.  Exercise every day, if your doctor says it is okay. If you have not exercised in the past, start out slowly. Take many short walks each day.  Eat a balanced diet. Make sure your diet includes plenty of beans, peas, and leafy green vegetables.  Drink plenty of fluids. Cut down on drinks with caffeine, such as coffee, tea, and cola. If you have kidney, heart, or liver disease and have to limit fluids, talk with your doctor before you increase the amount of fluids you drink.  Avoid chemical fumes, paint fumes, and poisons. Do not use alcohol, marijuana, or illegal drugs. Do not smoke, vape, or use tobacco. If you need help quitting, talk to your doctor about stop-smoking programs and medicines. These can increase your chances of quitting for good.  Review all of your medicines with your doctor. Some of your routine medicines may need to be changed to protect your baby. Do not stop or start taking any medicines without talking to your doctor first.  Follow-up care is a key part of your treatment and safety. Be sure to make and go to all appointments, and call your doctor if you are having problems. It's also a good idea to know your test results and keep a list of the  "medicines you take.  Where can you learn more?  Go to https://www.healthReelSurfer.net/patiented  Enter J502 in the search box to learn more about \"Learning About Prenatal Visits.\"  Current as of: November 9, 2022               Content Version: 13.7    1677-2746 [employer].   Care instructions adapted under license by your healthcare professional. If you have questions about a medical condition or this instruction, always ask your healthcare professional. [employer] disclaims any warranty or liability for your use of this information.      Domestic Abuse: Care Instructions  Overview     If you want to save this information but don't think it is safe to take it home, see if a trusted friend can keep it for you. Plan ahead. Know who you can call for help, and memorize the phone number.   Be careful online too. Your online activity may be seen by others. Do not use your personal computer or device to read about this topic. Use a safe computer such as one at work, a friend's house, or a library.    Domestic abuse is different from an argument now and then. It is a pattern of abuse that one person uses to control another person's behavior. It may start with threats and name-calling. Then, it may lead to more serious acts, like pushing and slapping. The abuse also may occur in other areas. For example, the abuser may withhold money or spend a partner's money without their knowledge.  Abuse can cause serious harm. You are more likely to have a long-term health problem from the injuries and stress of living in a violent relationship. People who are sexually abused by their partners have more sexually transmitted infections and unwanted pregnancies. Anyone can be abused in relationships. Anyone who is abused also faces emotional pain.  If you are pregnant, abuse can cause problems such as poor weight gain, infections, and bleeding. Abuse during this time may increase your baby's risk of low birth " "weight, premature birth, and death.  Follow-up care is a key part of your treatment and safety. Be sure to make and go to all appointments, and call your doctor if you are having problems. It's also a good idea to know your test results and keep a list of the medicines you take.  How can you care for yourself at home?  If you do not have a safe place to stay, discuss this with your doctor before you leave.  Have a plan for where to go, how to leave your house, and where to stay in case of an emergency. Do not tell your partner about your plan. Contact:  The National Domestic Violence Hotline toll-free at 1-775.846.6540. They can help you find resources in your area.  Your local police department, hospital, or clinic for information about shelters and safe homes near you.  Talk to a trusted friend or neighbor or a [employer]ian[employer] counselor. Do not feel that you have to hide what happened.  Teach your children how to call for help in an emergency.  Be alert to warning signs, such as threats, heavy alcohol use, or drug use. This can help you avoid danger.  If you can, make sure that there are no guns or other weapons in the house.  When should you call for help?   Call 911 anytime you think you may need emergency care. For example, call if:    You or someone else has just been abused.     You think you or someone else is in danger of being abused.   Watch closely for changes in your health, and be sure to contact your doctor if you have any problems.  Where can you learn more?  Go to https://www.Nexus eWater.net/patiented  Enter G282 in the search box to learn more about \"Domestic Abuse: Care Instructions.\"  Current as of: February 27, 2023               Content Version: 13.7    9692-9451 [employer].   Care instructions adapted under license by your healthcare professional. If you have questions about a medical condition or this instruction, always ask your healthcare professional. Healthwise, [employer] " disclaims any warranty or liability for your use of this information.      Domestic Violence Safety Instructions: Care Instructions  Overview     If you want to save this information but don't think it is safe to take it home, see if a trusted friend can keep it for you. Plan ahead. Know who you can call for help, and memorize the phone number.   Be careful online too. Your online activity may be seen by others. Do not use your personal computer or device to read about this topic. Use a safe computer such as one at work, a friend's house, or a library.    When you are abused by a spouse or partner, you can take actions to protect yourself and your children.  You can increase your safety whether you decide to stay with your spouse or partner or you decide to leave. You can also prepare an action plan and kit ahead of time. This will allow you to leave quickly when you decide to. Remember, you cannot stop your partner's abuse, but you can find help for you and your children. No one deserves to be abused.  Follow-up care is a key part of your treatment and safety. Be sure to make and go to all appointments, and call your doctor if you are having problems. It's also a good idea to know your test results and keep a list of the medicines you take.  How can you care for yourself at home?  Make a plan for your safety   If you decide to stay with your abusive spouse or partner, you can do the following to increase your safety:  Decide what works best to keep you safe in an emergency.  Decide who you can call to help you in an emergency.  Decide if you will call the police if you get hurt again. If you can, agree on a signal with your children or neighbor to call the police for you if you need help. You can flash lights or hang something out of a window.  Choose a place to go for a short time if you need to leave home. Memorize the address and phone number.  Learn escape routes out of your home in case you need to leave in a  hurry. Teach your children different ways to get out of the house quickly if they need to.  Take objects that can be used as weapons (guns, knives, hammers) and hide them or lock them up.  Learn the number of a domestic violence shelter. Talk to the people there about how they can help.  Find out about other community resources that can help you.  Take pictures of bruises or other injuries if you can. You can also take pictures of things your abuser has broken.  Teach your children that violence is never okay. Tell them that they do not deserve to be hurt.  Pack a bag   Prepare a kit with things you will need if you leave the house suddenly. You can try to hide this in your house, or you can leave it with a friend or relative you can trust. You should include the following items in the kit:  A set of keys to your house and car.  Emergency phone numbers and addresses. You might also want to have a map and a small flashlight in case you need to leave in the night.  Money such as cash or checks. You can also ask a trusted friend or family member to hold money for you.  Copies of legal documents such as house and car titles or rent receipts, birth certificates, Social Security card, voter registration, marriage and 's licenses, and your children's health records.  Personal items you would need for a few days, such as clothes, a toothbrush, toothpaste, and any medicines you or your children need.  A favorite toy or book for your child or children.  Diapers and bottles, if you have very young children.  Pictures that show signs of abuse and violence. You may also add pictures of your abuser.  If you leave   If you decide to leave, you can take the following steps:  Go to the emergency room at a hospital if you have been hurt.  Ask the police to be with you as you leave. They can protect you as you leave the house.  If you decide to leave secretly, remember that activities can be tracked. Your abuser may still have  "access to your cell phone, email, and credit cards. It may be possible for these to be traced. Always be aware of your surroundings.  Take the kit you have prepared. If this is an emergency, do not worry about gathering up anything. Just leave--your safety is most important.  If your abuser moves out, change the locks on the doors. If you have a security system, change the access code.  When should you call for help?   Call 911 anytime you think you may need emergency care. For example, call if:    You or someone else has just been abused.     You think you or someone else is in danger of being abused.   Watch closely for changes in your health, and be sure to contact your doctor if you have any problems.  Where can you learn more?  Go to https://www.musiXmatch.net/patiented  Enter A752 in the search box to learn more about \"Domestic Violence Safety Instructions: Care Instructions.\"  Current as of: October 20, 2022               Content Version: 13.7    4154-2609 [employer].   Care instructions adapted under license by your healthcare professional. If you have questions about a medical condition or this instruction, always ask your healthcare professional. [employer] disclaims any warranty or liability for your use of this information.      Learning About Domestic Abuse  What is domestic abuse?  Domestic abuse is threats or violent behavior in a personal relationship. It can happen between past or current partners or spouses. It's also called domestic violence or intimate partner violence.  Domestic abuse can affect people of any ethnic group, race, or Shinto. It can affect teens, adults, or the elderly. And it can happen to people of any sexual identity or social status. But most abuse victims are women.  Abusers use fear, bullying, and threats to control their partners. They control what their partners do, where they go, or who they see. They may act jealous, controlling, or " possessive. These early signs of abuse may happen soon after the start of the relationship. Sometimes it can be hard to notice abuse at first. But after the relationship becomes more serious, the abuse may get worse.  If you are being abused in your relationship, it's important to get help. The abuse is not your fault, and you don't have to face it alone.  Be careful  It may not be safe to take home domestic abuse information like this handout. Some people ask a trusted friend to keep it for them. It's also important to plan ahead and to memorize the phone number of places you can go for help. If you are concerned about your safety, do not use your computer, smartphone, or tablet to read about domestic abuse.   What are the types of domestic abuse?  Abuse can be emotional, physical, or sexual.  Emotional abuse  Emotional abuse is a pattern of threats, insults, or controlling behavior. It includes verbal abuse. It goes beyond healthy disagreements in a relationship. It's a sign of an unhealthy relationship, and it may be against the law.  Do you feel threatened, intimidated, or controlled? Does your partner threaten your children, other family members, or pets?  Does your partner:  Use jokes meant to embarrass or shame you?  Call you names?  Tell you that you are a bad parent or threaten to take away your children?  Threaten to have you or your family members deported?  Control your access to money or other basic needs?  Control what you do, who you see or talk to, or where you go?  Another form of emotional abuse is denying that it is happening. Or the abuser may act like the abuse is no big deal or is your fault.  Sexual abuse  With sexual abuse, abusers may try to convince or force you to have sex. They may force you into sex acts you're not comfortable with. Or they may sexually assault you. Sexual abuse can happen even if you are in a committed relationship.  Physical abuse  Physical abuse means that a partner  hits, kicks, or physically hurts you. Physical abuse that starts with a slap might lead to kicking, shoving, and choking over time. The abuser may also threaten to hurt or kill you.  What problems can domestic abuse lead to?  Domestic abuse can be very dangerous. It can cause serious, repeated injury. It can even lead to death.  All forms of abuse can cause long-term health problems from the stress of a violent relationship. Verbal abuse can lead to sexual and physical abuse.  Abuse causes emotional pain, depression, anxiety, and post-traumatic stress. Sexual abuse can lead to sexually transmitted infections (including HIV/AIDS) and unplanned pregnancy.  Pregnancy can be a very dangerous time for people in abusive relationships. Pregnant people who are abused may have anemia, infections, bleeding, or poor weight gain. Abuse during this time may also increase your baby's risk of low birth weight, premature birth, and death.  It can be hard for some victims of domestic abuse to ask for help or to leave their relationship. You may feel scared, stuck, or not sure what steps to take. But it's important not to ignore abuse. Talking to someone could be the first step to ending the abuse and taking care of your own health and happiness again.  Where can you get help?  Talk to a trusted friend. Find a local advocacy group, or talk to your doctor about the abuse.  Contact the National Domestic Violence Hotline at 7-986-182-NEMK (1-579.437.9436) for more safety tips. They can guide you to groups in your area that can help. Or go to the National Coalition Against Domestic Violence website at www.thehotline.org to learn more.  Domestic violence groups or a counselor in your area can help you make a safety plan for yourself and your children.  When to call for help  Call 911 anytime you think you may need emergency care. For example, call if:  You think that you or someone you know is in danger of being abused.  You have been  "hurt and can't have someone safely take you to emergency care.  You have just been abused.  A family member has just been abused.  Where can you learn more?  Go to https://www.DataLocker.net/patiented  Enter S665 in the search box to learn more about \"Learning About Domestic Abuse.\"  Current as of: February 27, 2023               Content Version: 13.7    2011-1421 [employer].   Care instructions adapted under license by your healthcare professional. If you have questions about a medical condition or this instruction, always ask your healthcare professional. [employer] disclaims any warranty or liability for your use of this information.      Vaginal Bleeding During Pregnancy: Care Instructions  Overview     It's common to have some vaginal spotting when you are pregnant. In some cases, the bleeding isn't serious. And there aren't any more problems with the pregnancy.  But sometimes bleeding is a sign of a more serious problem. This is more common if the bleeding is heavy or painful. Examples of more serious problems include miscarriage, an ectopic pregnancy, and a problem with the placenta.  You may have to see your doctor again to be sure everything is okay. You may also need more tests to find the cause of the bleeding.  Home treatment may be all you need. But it depends on what is causing the bleeding. Be sure to tell your doctor if you have any new symptoms or if your symptoms get worse.  The doctor has checked you carefully, but problems can develop later. If you notice any problems or new symptoms, get medical treatment right away.  Follow-up care is a key part of your treatment and safety. Be sure to make and go to all appointments, and call your doctor if you are having problems. It's also a good idea to know your test results and keep a list of the medicines you take.  How can you care for yourself at home?  If your doctor prescribed medicines, take them exactly as directed. Call " "your doctor if you think you are having a problem with your medicine.  Do not have vaginal sex until your doctor says it's okay.  Do not put anything in your vagina until your doctor says it's okay.  Ask your doctor about other activities you can or can't do.  Get a lot of rest. Being pregnant can make you tired.  Do not use nonsteroidal anti-inflammatory drugs (NSAIDs), such as ibuprofen (Advil, Motrin), naproxen (Aleve), or aspirin, unless your doctor says it is okay.  When should you call for help?   Call 911 anytime you think you may need emergency care. For example, call if:    You passed out (lost consciousness).     You have severe vaginal bleeding. This means you are soaking through a pad each hour for 2 or more hours.     You have sudden, severe pain in your belly or pelvis.   Call your doctor now or seek immediate medical care if:    You have new or worse vaginal bleeding.     You are dizzy or lightheaded, or you feel like you may faint.     You have pain in your belly, pelvis, or lower back.     You think that you are in labor.     You have a sudden release of fluid from your vagina.     You've been having regular contractions for an hour. This means that you've had at least 8 contractions within 1 hour or at least 4 contractions within 20 minutes, even after you change your position and drink fluids.     You notice that your baby has stopped moving or is moving much less than normal.   Watch closely for changes in your health, and be sure to contact your doctor if you have any problems.  Where can you learn more?  Go to https://www.Motionbox.net/patiented  Enter N829 in the search box to learn more about \"Vaginal Bleeding During Pregnancy: Care Instructions.\"  Current as of: November 9, 2022               Content Version: 13.7    8447-9855 [employer].   Care instructions adapted under license by your healthcare professional. If you have questions about a medical condition or this " instruction, always ask your healthcare professional. [employer] disclaims any warranty or liability for your use of this information.

## 2023-10-30 NOTE — PROGRESS NOTES
SUBJECTIVE:     HPI:    This is a 28 year old female patient,  who presents for her first obstetrical visit.    SRIRAM: 2024, by Last Menstrual Period.  She is 6w3d weeks.  Her cycles are irregular-was having 28 day cycles, the last 3 months approx 33 day cycles.  Her last menstrual period was normal.   Since her LMP, she has experienced  nausea, fatigue, and breast tenderness .      Additional History: Hx of depression, well controlled on meds.   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    Past History:  Her past medical history   Past Medical History:   Diagnosis Date    Anxiety     Depression    .      She has a history of 2 SAB, anxiety and dep    Since her last LMP she denies use of alcohol, tobacco and street drugs.    Past medical, surgical, social and family history were reviewed and updated in [facility].      Current Outpatient Medications   Medication    buPROPion (WELLBUTRIN XL) 150 MG 24 hr tablet    citalopram (CELEXA) 20 MG tablet    Prenat w/o A-FeCbGl-DSS-FA-DHA (PNV OB+DHA PO)    traZODone (DESYREL) 50 MG tablet     No current facility-administered medications for this visit.       ROS:   12 point review of systems negative other than symptoms noted below or in the HPI.  Constitutional: Fatigue  Breast: Tenderness  Gastrointestinal: Nausea    Nurse phone visit completed. Prenatal book and folder containing standard educational hand-outs and brochures  will be given at the next visit to patient. Information in folder reviewed over the phone. Questions answered. Information given on optional screening available to assess chromosomal anomalies. Pt desires NIPS. Pt advised to call the clinic if she has any  "questions or concerns related to her pregnancy. Prenatal labs future ordered.   Dina Flores RN on 10/30/2023 at 9:20 AM        No results found for: \"PAP\"  PHQ-9 score:        10/27/2023    10:15 PM   PHQ   PHQ-9 Total Score 8   Q9: Thoughts of better off dead/self-harm past 2 weeks Not at all                   10/27/2023    10:15 PM   ANNA-7 SCORE   Total Score 5 (mild anxiety)   Total Score 5             Patient supplied answers from flow sheet for:  Prenatal OB Questionnaire.  Past Medical History  Have you ever recieved care for your mental health? : (!) Yes  Have you ever been in a major accident or suffered serious trauma?: No  Within the last year, has anyone hit, slapped, kicked or otherwise hurt you?: No  In the last year, has anyone forced you to have sex when you didn't want to?: No    Past Medical History 2   Have you ever received a blood transfusion?: No  Would you accept a blood transfusion if was medically recommended?: Yes  Does anyone in your home smoke?: No   Is your blood type Rh negative?: Unknown  Have you ever ?: No  Have you been hospitalized for a nonsurgical reason excluding normal delivery?: (!) Yes  Have you ever had an abnormal pap smear?: No    Past Medical History (Continued)  Do you have a history of abnormalities of the uterus?: Unknown  Did your mother take DELLA or any other hormones when she was pregnant with you?: Unknown  Do you have any other problems we have not asked about which you feel may be important to this pregnancy?: No                   OBJECTIVE:     EXAM:  Ht 1.753 m (5' 9\")   Wt 90.7 kg (200 lb)   LMP 09/15/2023   BMI 29.53 kg/m   Body mass index is 29.53 kg/m .    "

## 2023-11-13 NOTE — PROGRESS NOTES
SUBJECTIVE:      HPI:     This is a 28 year old female patient,  who presents for her first obstetrical visit.     SRIRAM: 2024, by 7 wk US.  She is 7w3d.  Her cycles are irregular-was having 28 day cycles, the last 3 months approx 33 day cycles.  Her last menstrual period was normal.   Since her LMP, she has experienced  nausea, fatigue, and breast tenderness .       Additional History:   Hx of depression, well controlled on meds.  2 SAB with D and C  and   Did note small amount of spotting on Friday.     Have you travelled during the pregnancy? No  Have your sexual partner(s) travelled during the pregnancy? 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     Past History:  Her past medical history   Past Medical History        Past Medical History:   Diagnosis Date    Anxiety      Depression        .       She has a history of 2 SAB, anxiety and dep     Since her last LMP she denies use of alcohol, tobacco and street drugs.     Past medical, surgical, social and family history were reviewed and updated in [facility].            Current Outpatient Medications   Medication    buPROPion (WELLBUTRIN XL) 150 MG 24 hr tablet    citalopram (CELEXA) 20 MG tablet    Prenat w/o A-FeCbGl-DSS-FA-DHA (PNV OB+DHA PO)    traZODone (DESYREL) 50 MG tablet      No current facility-administered medications for this visit.         ROS:   12 point review of systems negative other than symptoms noted below or in the HPI.  Constitutional: Fatigue  Breast: Tenderness  Gastrointestinal: Nausea     Nurse phone visit completed. Prenatal book and folder containing standard educational hand-outs and brochures  will be given at the next visit to patient. Information in folder reviewed over the phone. Questions answered. Information given on optional screening available to assess  "chromosomal anomalies. Pt desires NIPS. Pt advised to call the clinic if she has any questions or concerns related to her pregnancy. Prenatal labs future ordered.   Dina Flores RN on 10/30/2023 at 9:20 AM       Last PAP: 04/05/23 NIL    PHQ-9 score:         10/27/2023    10:15 PM   PHQ   PHQ-9 Total Score 8   Q9: Thoughts of better off dead/self-harm past 2 weeks Not at all          10/27/2023    10:15 PM   ANNA-7 SCORE   Total Score 5 (mild anxiety)   Total Score 5     Patient supplied answers from flow sheet for:  Prenatal OB Questionnaire.  Past Medical History  Have you ever recieved care for your mental health? : (!) Yes  Have you ever been in a major accident or suffered serious trauma?: No  Within the last year, has anyone hit, slapped, kicked or otherwise hurt you?: No  In the last year, has anyone forced you to have sex when you didn't want to?: No     Past Medical History 2   Have you ever received a blood transfusion?: No  Would you accept a blood transfusion if was medically recommended?: Yes  Does anyone in your home smoke?: No   Is your blood type Rh negative?: Unknown  Have you ever ?: No  Have you been hospitalized for a nonsurgical reason excluding normal delivery?: (!) Yes  Have you ever had an abnormal pap smear?: No     Past Medical History (Continued)  Do you have a history of abnormalities of the uterus?: Unknown  Did your mother take DELLA or any other hormones when she was pregnant with you?: Unknown  Do you have any other problems we have not asked about which you feel may be important to this pregnancy?: No    OBJECTIVE:     EXAM:  /70   Ht 1.74 m (5' 8.5\")   Wt 89.4 kg (197 lb)   LMP 09/15/2023   BMI 29.52 kg/m   Body mass index is 29.52 kg/m .    GENERAL: healthy, alert and no distress  EYES: Eyes grossly normal to inspection, PERRL and conjunctivae and sclerae normal  PSYCH: mentation appears normal, affect normal/bright    ASSESSMENT/PLAN:       ICD-10-CM    1. " Supervision of high risk pregnancy in first trimester  O09.91 Urine Culture Aerobic Bacterial     *UA reflex to Microscopic          28 year old , On November 15, 2023 patient is 8w5d weeks of pregnancy with SRIRAM of 2024, by Last Menstrual Period    Counseling given:   - Follow up in 4 weeks for return OB visit.  - Recommended weight gain for pregnancy: 15-25 lbs.    PLAN/PATIENT INSTRUCTIONS:    -Flu and Covid Vaccine given today   -Pap due   -First trimester labs today  -Can try B6 and ashley supplements for nausea. Zofran prescription sent as well     Dania Walker MD

## 2023-11-15 ENCOUNTER — PRENATAL OFFICE VISIT (OUTPATIENT)
Dept: OBGYN | Facility: CLINIC | Age: 28
End: 2023-11-15
Payer: COMMERCIAL

## 2023-11-15 ENCOUNTER — ANCILLARY PROCEDURE (OUTPATIENT)
Dept: ULTRASOUND IMAGING | Facility: CLINIC | Age: 28
End: 2023-11-15
Payer: COMMERCIAL

## 2023-11-15 VITALS
DIASTOLIC BLOOD PRESSURE: 70 MMHG | WEIGHT: 197 LBS | HEIGHT: 69 IN | SYSTOLIC BLOOD PRESSURE: 110 MMHG | BODY MASS INDEX: 29.18 KG/M2

## 2023-11-15 DIAGNOSIS — Z23 HIGH PRIORITY FOR 2019-NCOV VACCINE: ICD-10-CM

## 2023-11-15 DIAGNOSIS — O36.80X0 PREGNANCY WITH INCONCLUSIVE FETAL VIABILITY: ICD-10-CM

## 2023-11-15 DIAGNOSIS — O21.9 NAUSEA AND VOMITING DURING PREGNANCY: ICD-10-CM

## 2023-11-15 DIAGNOSIS — O09.91 SUPERVISION OF HIGH RISK PREGNANCY IN FIRST TRIMESTER: Primary | ICD-10-CM

## 2023-11-15 DIAGNOSIS — Z23 NEED FOR PROPHYLACTIC VACCINATION AND INOCULATION AGAINST INFLUENZA: ICD-10-CM

## 2023-11-15 LAB
ALBUMIN UR-MCNC: NEGATIVE MG/DL
APPEARANCE UR: CLEAR
BILIRUB UR QL STRIP: NEGATIVE
COLOR UR AUTO: YELLOW
GLUCOSE UR STRIP-MCNC: NEGATIVE MG/DL
HGB UR QL STRIP: NEGATIVE
KETONES UR STRIP-MCNC: NEGATIVE MG/DL
LEUKOCYTE ESTERASE UR QL STRIP: NEGATIVE
NITRATE UR QL: NEGATIVE
PH UR STRIP: 7.5 [PH] (ref 5–7)
SP GR UR STRIP: 1.01 (ref 1–1.03)
UROBILINOGEN UR STRIP-ACNC: 0.2 E.U./DL

## 2023-11-15 PROCEDURE — 87086 URINE CULTURE/COLONY COUNT: CPT | Performed by: OBSTETRICS & GYNECOLOGY

## 2023-11-15 PROCEDURE — 76817 TRANSVAGINAL US OBSTETRIC: CPT | Performed by: OBSTETRICS & GYNECOLOGY

## 2023-11-15 PROCEDURE — 99203 OFFICE O/P NEW LOW 30 MIN: CPT | Mod: 25 | Performed by: OBSTETRICS & GYNECOLOGY

## 2023-11-15 PROCEDURE — 90686 IIV4 VACC NO PRSV 0.5 ML IM: CPT | Performed by: OBSTETRICS & GYNECOLOGY

## 2023-11-15 PROCEDURE — 91320 SARSCV2 VAC 30MCG TRS-SUC IM: CPT | Performed by: OBSTETRICS & GYNECOLOGY

## 2023-11-15 PROCEDURE — 90471 IMMUNIZATION ADMIN: CPT | Performed by: OBSTETRICS & GYNECOLOGY

## 2023-11-15 PROCEDURE — 90480 ADMN SARSCOV2 VAC 1/ONLY CMP: CPT | Performed by: OBSTETRICS & GYNECOLOGY

## 2023-11-15 PROCEDURE — 81003 URINALYSIS AUTO W/O SCOPE: CPT | Performed by: OBSTETRICS & GYNECOLOGY

## 2023-11-15 RX ORDER — ONDANSETRON 4 MG/1
4 TABLET, ORALLY DISINTEGRATING ORAL EVERY 8 HOURS PRN
Qty: 30 TABLET | Refills: 1 | Status: ON HOLD | OUTPATIENT
Start: 2023-11-15 | End: 2024-06-07

## 2023-11-16 LAB — BACTERIA UR CULT: NORMAL

## 2023-12-05 LAB
ABO/RH(D): NORMAL
ANTIBODY SCREEN: NEGATIVE
SPECIMEN EXPIRATION DATE: NORMAL

## 2023-12-06 ENCOUNTER — LAB (OUTPATIENT)
Dept: LAB | Facility: CLINIC | Age: 28
End: 2023-12-06
Payer: COMMERCIAL

## 2023-12-06 DIAGNOSIS — O09.91 SUPERVISION OF HIGH RISK PREGNANCY IN FIRST TRIMESTER: ICD-10-CM

## 2023-12-06 DIAGNOSIS — Z13.79 GENETIC TESTING: Primary | ICD-10-CM

## 2023-12-06 DIAGNOSIS — Z13.79 GENETIC SCREENING: ICD-10-CM

## 2023-12-06 LAB
ERYTHROCYTE [DISTWIDTH] IN BLOOD BY AUTOMATED COUNT: 12.6 % (ref 10–15)
HBV SURFACE AG SERPL QL IA: NONREACTIVE
HCT VFR BLD AUTO: 39.8 % (ref 35–47)
HGB BLD-MCNC: 13.6 G/DL (ref 11.7–15.7)
HIV 1+2 AB+HIV1 P24 AG SERPL QL IA: NONREACTIVE
MCH RBC QN AUTO: 31.3 PG (ref 26.5–33)
MCHC RBC AUTO-ENTMCNC: 34.2 G/DL (ref 31.5–36.5)
MCV RBC AUTO: 92 FL (ref 78–100)
PLATELET # BLD AUTO: 273 10E3/UL (ref 150–450)
RBC # BLD AUTO: 4.35 10E6/UL (ref 3.8–5.2)
WBC # BLD AUTO: 7.3 10E3/UL (ref 4–11)

## 2023-12-06 PROCEDURE — 85027 COMPLETE CBC AUTOMATED: CPT

## 2023-12-06 PROCEDURE — 87389 HIV-1 AG W/HIV-1&-2 AB AG IA: CPT

## 2023-12-06 PROCEDURE — 86850 RBC ANTIBODY SCREEN: CPT

## 2023-12-06 PROCEDURE — 86780 TREPONEMA PALLIDUM: CPT

## 2023-12-06 PROCEDURE — 86901 BLOOD TYPING SEROLOGIC RH(D): CPT

## 2023-12-06 PROCEDURE — 87340 HEPATITIS B SURFACE AG IA: CPT

## 2023-12-06 PROCEDURE — 86762 RUBELLA ANTIBODY: CPT

## 2023-12-06 PROCEDURE — 36415 COLL VENOUS BLD VENIPUNCTURE: CPT

## 2023-12-06 PROCEDURE — 86900 BLOOD TYPING SEROLOGIC ABO: CPT

## 2023-12-07 LAB
RUBV IGG SERPL QL IA: 0.77 INDEX
RUBV IGG SERPL QL IA: NORMAL
T PALLIDUM AB SER QL: NONREACTIVE

## 2023-12-11 LAB — SCANNED LAB RESULT: NORMAL

## 2023-12-12 ENCOUNTER — PRENATAL OFFICE VISIT (OUTPATIENT)
Dept: OBGYN | Facility: CLINIC | Age: 28
End: 2023-12-12
Payer: COMMERCIAL

## 2023-12-12 VITALS — BODY MASS INDEX: 29.58 KG/M2 | SYSTOLIC BLOOD PRESSURE: 104 MMHG | WEIGHT: 197.4 LBS | DIASTOLIC BLOOD PRESSURE: 64 MMHG

## 2023-12-12 DIAGNOSIS — O09.91 SUPERVISION OF HIGH RISK PREGNANCY IN FIRST TRIMESTER: Primary | ICD-10-CM

## 2023-12-12 DIAGNOSIS — F32.A DEPRESSION AFFECTING PREGNANCY, ANTEPARTUM: ICD-10-CM

## 2023-12-12 DIAGNOSIS — O99.340 DEPRESSION AFFECTING PREGNANCY, ANTEPARTUM: ICD-10-CM

## 2023-12-12 DIAGNOSIS — Z3A.11 11 WEEKS GESTATION OF PREGNANCY: ICD-10-CM

## 2023-12-12 PROCEDURE — 99207 PR PRENATAL VISIT: CPT | Performed by: OBSTETRICS & GYNECOLOGY

## 2023-12-12 NOTE — PROGRESS NOTES
Kasey Carrera is a 28 year old  at 11w2d     She complains of nausea. Has not needed any medication.  Denies LOF, VB, ctx. No FM.      ICD-10-CM    1. Supervision of high risk pregnancy in first trimester  O09.91       2. 11 weeks gestation of pregnancy  Z3A.11       3. Depression affecting pregnancy, antepartum  O99.340     F32.A           - First tri labs wnl.   - NIPT normal/Surprise; AFP discussed doing at 15 weeks  - Anatomy US  needs to be scheduled at 19-20 weeks    - 3rd tri labs: due at 28 weeks  - GBS due at 36 weeks  - Reviewed Labor and PTL precautions, expected fetal movement and kick counts, PreE signs/symptoms  - Anxiety/Depression. On Citalopram 20mg/Wellbutrin 150mg. Dign[facility] at [facility].  - TWG: -3 lbs. Pregravid BMI 29 Expect 25-35 lbs.  - follow-up in 4 weeks

## 2023-12-28 ENCOUNTER — NURSE TRIAGE (OUTPATIENT)
Dept: NURSING | Facility: CLINIC | Age: 28
End: 2023-12-28
Payer: COMMERCIAL

## 2023-12-28 NOTE — TELEPHONE ENCOUNTER
"OB Triage Call      Is patient's OB/Midwife with the [facility] or [facility]? [facility]- Proceed with triage     Reason for call: Pt is phoning stating that she has been having diarrhea for the past 2 days and is now vomiting     Assessment: Pt has had diarrhea for the past 2 days and is now vomiting     No fever     No contractions     No fluid coming from vagina     Plan: Home Care         13w4d    Estimated Date of Delivery: 2024        OB History    Para Term  AB Living   3 0 0 0 2 0   SAB IAB Ectopic Multiple Live Births   2 0 0 0 0      # Outcome Date GA Lbr Salvatore/2nd Weight Sex Delivery Anes PTL Lv   3 Current            2 2022           1 2021               No results found for: \"GBS\"       Fallon Juan RN 23 6:07 AM  [facility] Nurse Advisor  Reason for Disposition   MILD or MODERATE vomiting (e.g., 1 - 5 times / day)    Additional Information   Negative: Shock suspected (e.g., cold/pale/clammy skin, too weak to stand, low BP, rapid pulse)   Negative: Difficult to awaken or acting confused (e.g., disoriented, slurred speech)   Negative: Sounds like a life-threatening emergency to the triager   Negative: Vomiting occurs only while coughing   Negative: [1] Pregnant < 20 Weeks AND [2] nausea/vomiting began in early pregnancy (i.e., 4-8 weeks pregnant)   Negative: Chest pain   Negative: Headache is main symptom   Negative: Vomiting (or Nausea) in a cancer patient who is currently (or recently) receiving chemotherapy or radiation therapy, or cancer patient who has metastatic or end-stage cancer and is receiving palliative care   Negative: [1] Vomiting AND [2] contains red blood or black (\"coffee ground\") material  (Exception: Few red streaks in vomit that only happened once.)   Negative: Severe pain in one eye   Negative: Recent head injury (within last 3 days)   Negative: Recent abdominal injury (within last 3 days)   Negative: [1] Insulin-dependent " diabetes (Type I) AND [2] glucose > 400 mg/dl (22 mmol/l)   Negative: [1] Vomiting AND [2] hernia is more painful or swollen than usual   Negative: [1] SEVERE vomiting (e.g., 6 or more times/day) AND [2] present > 8 hours (Exception: Patient sounds well, is drinking liquids, does not sound dehydrated, and vomiting has lasted less than 24 hours.)   Negative: [1] MODERATE vomiting (e.g., 3 - 5 times/day) AND [2] age > 60 years   Negative: Severe headache (e.g., excruciating)  (Exception: Similar to previous migraines.)   Negative: High-risk adult (e.g., diabetes mellitus, brain tumor, V-P shunt, hernia)   Negative: [1] Drinking very little AND [2] dehydration suspected (e.g., no urine > 12 hours, very dry mouth, very lightheaded)   Negative: Patient sounds very sick or weak to the triager   Negative: [1] Vomiting AND [2] abdomen looks much more swollen than usual   Negative: [1] Vomiting AND [2] contains bile (green color)   Negative: [1] Constant abdominal pain AND [2] present > 2 hours   Negative: [1] Fever > 103 F (39.4 C) AND [2] not able to get the fever down using Fever Care Advice   Negative: [1] Fever > 101 F (38.3 C) AND [2] age > 60 years   Negative: [1] Fever > 100.0 F (37.8 C) AND [2] bedridden (e.g., CVA, chronic illness, recovering from surgery)   Negative: [1] Fever > 100.0 F (37.8 C) AND [2] weak immune system (e.g., HIV positive, cancer chemo, splenectomy, organ transplant, chronic steroids)   Negative: Taking any of the following medications: digoxin (Lanoxin), lithium, theophylline, phenytoin (Dilantin)   Negative: [1] MILD or MODERATE vomiting AND [2] present > 48 hours (2 days) (Exception: Mild vomiting with associated diarrhea.)   Negative: Fever present > 3 days (72 hours)   Negative: Vomiting a prescription medication   Negative: [1] MILD vomiting with diarrhea AND [2] present > 5 days   Negative: Substance use (drug use) or unhealthy alcohol use, known or suspected   Negative: Vomiting is a  chronic symptom (recurrent or ongoing AND present > 4 weeks)   Negative: [1] SEVERE vomiting (e.g., 6 or more times/day, vomits everything) BUT [2] hydrated    Protocols used: Vomiting-A-AH

## 2023-12-30 ENCOUNTER — HEALTH MAINTENANCE LETTER (OUTPATIENT)
Age: 28
End: 2023-12-30

## 2024-01-10 ENCOUNTER — PRENATAL OFFICE VISIT (OUTPATIENT)
Dept: OBGYN | Facility: CLINIC | Age: 29
End: 2024-01-10
Payer: COMMERCIAL

## 2024-01-10 VITALS — WEIGHT: 200 LBS | BODY MASS INDEX: 29.62 KG/M2 | HEIGHT: 69 IN

## 2024-01-10 VITALS
BODY MASS INDEX: 28.91 KG/M2 | WEIGHT: 195.2 LBS | DIASTOLIC BLOOD PRESSURE: 62 MMHG | HEIGHT: 69 IN | SYSTOLIC BLOOD PRESSURE: 112 MMHG

## 2024-01-10 DIAGNOSIS — O09.92 SUPERVISION OF HIGH RISK PREGNANCY IN SECOND TRIMESTER: Primary | ICD-10-CM

## 2024-01-10 DIAGNOSIS — Z36.1 NEED FOR MATERNAL SERUM ALPHA-PROTEIN (MSAFP) SCREENING: ICD-10-CM

## 2024-01-10 DIAGNOSIS — Z3A.15 15 WEEKS GESTATION OF PREGNANCY: ICD-10-CM

## 2024-01-10 PROCEDURE — 36415 COLL VENOUS BLD VENIPUNCTURE: CPT | Performed by: OBSTETRICS & GYNECOLOGY

## 2024-01-10 PROCEDURE — 99207 PR PRENATAL VISIT: CPT | Performed by: OBSTETRICS & GYNECOLOGY

## 2024-01-10 PROCEDURE — 82105 ALPHA-FETOPROTEIN SERUM: CPT | Mod: 90 | Performed by: OBSTETRICS & GYNECOLOGY

## 2024-01-10 PROCEDURE — 99000 SPECIMEN HANDLING OFFICE-LAB: CPT | Performed by: OBSTETRICS & GYNECOLOGY

## 2024-01-10 ASSESSMENT — ANXIETY QUESTIONNAIRES
2. NOT BEING ABLE TO STOP OR CONTROL WORRYING: NOT AT ALL
GAD7 TOTAL SCORE: 6
IF YOU CHECKED OFF ANY PROBLEMS ON THIS QUESTIONNAIRE, HOW DIFFICULT HAVE THESE PROBLEMS MADE IT FOR YOU TO DO YOUR WORK, TAKE CARE OF THINGS AT HOME, OR GET ALONG WITH OTHER PEOPLE: SOMEWHAT DIFFICULT
GAD7 TOTAL SCORE: 6
6. BECOMING EASILY ANNOYED OR IRRITABLE: MORE THAN HALF THE DAYS
3. WORRYING TOO MUCH ABOUT DIFFERENT THINGS: SEVERAL DAYS
5. BEING SO RESTLESS THAT IT IS HARD TO SIT STILL: NOT AT ALL
7. FEELING AFRAID AS IF SOMETHING AWFUL MIGHT HAPPEN: SEVERAL DAYS
1. FEELING NERVOUS, ANXIOUS, OR ON EDGE: SEVERAL DAYS

## 2024-01-10 ASSESSMENT — PATIENT HEALTH QUESTIONNAIRE - PHQ9
SUM OF ALL RESPONSES TO PHQ QUESTIONS 1-9: 11
5. POOR APPETITE OR OVEREATING: SEVERAL DAYS

## 2024-01-10 NOTE — PROGRESS NOTES
Kasey Carrera is a 28 year old  at 15w3d     Nausea has improved.  Does report daily headaches. More often than prior to pregnancy. Currently uses Tylenol for headache when it is really bad. Does drink caffeine. Doesn't feel this makes a difference.   Denies LOF, VB, ctx. No FM.      ICD-10-CM    1. Supervision of high risk pregnancy in second trimester  O09.92       2. Need for maternal serum alpha-protein (MSAFP) screening  Z36.1 Alpha fetoprotein maternal screen      3. 15 weeks gestation of pregnancy  Z3A.15             - First tri labs wnl.   - NIPT normal/Surprise; AFP 1/10  - Anatomy US 2024  - 3rd tri labs: due at 28 weeks  - GBS due at 36 weeks  - Reviewed Labor and PTL precautions, expected fetal movement and kick counts, PreE signs/symptoms  - Anxiety/Depression. On Citalopram 20mg/Wellbutrin 150mg. Digna Rodriguez at [facility].  - TWG: -4.8 lbs, Pregravid BMI 29 Expect 25-35 lbs.  - follow-up in 4 weeks

## 2024-01-12 LAB
# FETUSES US: NORMAL
AFP MOM SERPL: 1.26
AFP SERPL-MCNC: 33 NG/ML
AGE - REPORTED: 30 YR
CURRENT SMOKER: NORMAL
FAMILY MEMBER DISEASES HX: NORMAL
GA METHOD: NORMAL
GA: NORMAL WK
IDDM PATIENT QL: NORMAL
INTEGRATED SCN PATIENT-IMP: NORMAL
SPECIMEN DRAWN SERPL: NORMAL

## 2024-02-07 ENCOUNTER — ANCILLARY PROCEDURE (OUTPATIENT)
Dept: ULTRASOUND IMAGING | Facility: CLINIC | Age: 29
End: 2024-02-07
Attending: OBSTETRICS & GYNECOLOGY
Payer: COMMERCIAL

## 2024-02-07 ENCOUNTER — PRENATAL OFFICE VISIT (OUTPATIENT)
Dept: OBGYN | Facility: CLINIC | Age: 29
End: 2024-02-07
Attending: OBSTETRICS & GYNECOLOGY
Payer: COMMERCIAL

## 2024-02-07 VITALS
BODY MASS INDEX: 28.81 KG/M2 | SYSTOLIC BLOOD PRESSURE: 120 MMHG | WEIGHT: 194.5 LBS | HEIGHT: 69 IN | DIASTOLIC BLOOD PRESSURE: 62 MMHG

## 2024-02-07 DIAGNOSIS — Z3A.19 19 WEEKS GESTATION OF PREGNANCY: ICD-10-CM

## 2024-02-07 DIAGNOSIS — F32.A DEPRESSION AFFECTING PREGNANCY, ANTEPARTUM: ICD-10-CM

## 2024-02-07 DIAGNOSIS — O44.40 LOW-LYING PLACENTA: ICD-10-CM

## 2024-02-07 DIAGNOSIS — O99.340 DEPRESSION AFFECTING PREGNANCY, ANTEPARTUM: ICD-10-CM

## 2024-02-07 DIAGNOSIS — O09.91 SUPERVISION OF HIGH RISK PREGNANCY IN FIRST TRIMESTER: ICD-10-CM

## 2024-02-07 DIAGNOSIS — O09.92 SUPERVISION OF HIGH RISK PREGNANCY IN SECOND TRIMESTER: Primary | ICD-10-CM

## 2024-02-07 PROCEDURE — 99207 PR PRENATAL VISIT: CPT | Performed by: OBSTETRICS & GYNECOLOGY

## 2024-02-07 PROCEDURE — 76805 OB US >/= 14 WKS SNGL FETUS: CPT | Performed by: OBSTETRICS & GYNECOLOGY

## 2024-02-07 NOTE — LETTER
February 7, 2024      Kasey Carrera  [address] [address]        To Whom It May Concern:    Kasey Carrera was seen in our clinic. She may return to work with the following: limited to light duty - lifting no greater than 30 pounds until she returns from her post-partum recovery.      Sincerely,       Dania Walker MD

## 2024-02-07 NOTE — PROGRESS NOTES
Kasey Carrera is a 28 year old  at 19w3d     Denies LOF, VB, ctx. No FM.      ICD-10-CM    1. Supervision of high risk pregnancy in second trimester  O09.92 US OB >14 Weeks Limited wo Fetal Measurement     US OB >14 Weeks Follow Up      2. Low-lying placenta  O44.40 US OB >14 Weeks Limited wo Fetal Measurement      3. Depression affecting pregnancy, antepartum  O99.340     F32.A       4. 19 weeks gestation of pregnancy  Z3A.19         - First tri labs wnl.   - NIPT normal/Surprise; AFP: WNL  - Anatomy US 2024: EFW 65%; Anterior low-lying, Breech, sub-optimal heart views   Recheck heart in 2 weeks   Plan recheck growth and placental location at 30 weeks  - Low-lying placenta: Pelvic rest recommended. Gave lifting restriction note for work  - 3rd tri labs: due at 28 weeks  - GBS due at 36 weeks  - Reviewed Labor and PTL precautions, expected fetal movement and kick counts, PreE signs/symptoms  - Anxiety/Depression. On Citalopram 20mg/Wellbutrin 150mg. Digna Rodriguez at [facility].  - TWG: -5 lbs, Pregravid BMI 29 Expect 25-35 lbs.  - follow-up in 4 weeks

## 2024-02-21 ENCOUNTER — ANCILLARY PROCEDURE (OUTPATIENT)
Dept: ULTRASOUND IMAGING | Facility: CLINIC | Age: 29
End: 2024-02-21
Attending: OBSTETRICS & GYNECOLOGY
Payer: COMMERCIAL

## 2024-02-21 DIAGNOSIS — O09.92 SUPERVISION OF HIGH RISK PREGNANCY IN SECOND TRIMESTER: ICD-10-CM

## 2024-02-21 DIAGNOSIS — O44.40 LOW-LYING PLACENTA: ICD-10-CM

## 2024-02-21 PROCEDURE — 76815 OB US LIMITED FETUS(S): CPT | Performed by: OBSTETRICS & GYNECOLOGY

## 2024-03-06 ENCOUNTER — TELEPHONE (OUTPATIENT)
Dept: OBGYN | Facility: CLINIC | Age: 29
End: 2024-03-06

## 2024-03-06 NOTE — TELEPHONE ENCOUNTER
[facility] Call Center    Phone Message    May a detailed message be left on voicemail: yes     Reason for Call: Other: . Pt missed her appt today at 8am, writer offered later appts with  today but pt cannot make them work, the next opening was a couple weeks out with any provider and pt is due for a follow up, please call pt to discuss, thank you    Action Taken: Message routed to:  Other: obgyn    Travel Screening: Not Applicable

## 2024-03-12 ENCOUNTER — PRENATAL OFFICE VISIT (OUTPATIENT)
Dept: OBGYN | Facility: CLINIC | Age: 29
End: 2024-03-12
Payer: COMMERCIAL

## 2024-03-12 ENCOUNTER — TELEPHONE (OUTPATIENT)
Dept: OBGYN | Facility: CLINIC | Age: 29
End: 2024-03-12

## 2024-03-12 VITALS
DIASTOLIC BLOOD PRESSURE: 60 MMHG | BODY MASS INDEX: 29.27 KG/M2 | WEIGHT: 197.6 LBS | HEIGHT: 69 IN | SYSTOLIC BLOOD PRESSURE: 110 MMHG

## 2024-03-12 DIAGNOSIS — O44.40 LOW-LYING PLACENTA: ICD-10-CM

## 2024-03-12 DIAGNOSIS — Z3A.24 24 WEEKS GESTATION OF PREGNANCY: ICD-10-CM

## 2024-03-12 DIAGNOSIS — O09.92 SUPERVISION OF HIGH RISK PREGNANCY IN SECOND TRIMESTER: Primary | ICD-10-CM

## 2024-03-12 PROCEDURE — 99207 PR PRENATAL VISIT: CPT | Performed by: OBSTETRICS & GYNECOLOGY

## 2024-03-12 NOTE — TELEPHONE ENCOUNTER
Type of Paperwork received:  FMLA    Date Rcvd:  3/12/2024    Rcvd From (Company name): [facility] [place], [place]    Provider:  Denise Aguilar on Provider Cart Date:  3/12/2024

## 2024-03-12 NOTE — PROGRESS NOTES
Kasey Carrera is a 28 year old  at 24w2d     Noting some round ligament vs pubic symphysis pain. Particularly with raising her leg.     Denies LOF, VB, ctx. No FM.      ICD-10-CM    1. Supervision of high risk pregnancy in second trimester  O09.92       2. 24 weeks gestation of pregnancy  Z3A.24       3. Low-lying placenta  O44.40           - First tri labs wnl.   - NIPT normal/Surprise; AFP: WNL  - Anatomy US 2024: EFW 65%; Anterior low-lying, Breech, sub-optimal heart views   : Recheck heart in 2 weeks   Plan recheck growth and placental location at 30 weeks  - Low-lying placenta: Pelvic rest recommended. Gave lifting restriction note for work  - 3rd tri labs: due at 28 weeks  - GBS due at 36 weeks  - Reviewed Labor and PTL precautions, expected fetal movement and kick counts, PreE signs/symptoms  - Discussed travel precautions up to 36 weeks  - Discussed watching youtube videos on pubic symphysis pain and exercises for this  - Anxiety/Depression. On Citalopram 20mg/Wellbutrin 150mg. Digna Rodriguez at [facility].  - TWG: -2 lbs, Pregravid BMI 29 Expect 25-35 lbs.  - follow-up in 4 weeks    Kusum Malik, MS3    Physician Attestation   I, Dania Walker, was present with the medical student who participated in the service and in the documentation of the note.  I have verified the history and personally performed the physical exam and medical decision making.  I agree with the assessment and plan of care as documented in the note.        Dania Walker  Date of Service (when I saw the patient): 24

## 2024-03-20 DIAGNOSIS — Z36.9 ENCOUNTER FOR ANTENATAL SCREENING OF MOTHER: Primary | ICD-10-CM

## 2024-04-03 ENCOUNTER — LAB (OUTPATIENT)
Dept: LAB | Facility: CLINIC | Age: 29
End: 2024-04-03
Payer: COMMERCIAL

## 2024-04-03 ENCOUNTER — PRENATAL OFFICE VISIT (OUTPATIENT)
Dept: OBGYN | Facility: CLINIC | Age: 29
End: 2024-04-03
Payer: COMMERCIAL

## 2024-04-03 VITALS
BODY MASS INDEX: 29.62 KG/M2 | HEIGHT: 69 IN | WEIGHT: 200 LBS | DIASTOLIC BLOOD PRESSURE: 63 MMHG | SYSTOLIC BLOOD PRESSURE: 116 MMHG

## 2024-04-03 DIAGNOSIS — Z3A.27 27 WEEKS GESTATION OF PREGNANCY: ICD-10-CM

## 2024-04-03 DIAGNOSIS — Z36.9 ENCOUNTER FOR ANTENATAL SCREENING OF MOTHER: ICD-10-CM

## 2024-04-03 DIAGNOSIS — Z23 NEED FOR TDAP VACCINATION: ICD-10-CM

## 2024-04-03 DIAGNOSIS — O09.92 SUPERVISION OF HIGH RISK PREGNANCY IN SECOND TRIMESTER: Primary | ICD-10-CM

## 2024-04-03 LAB
ERYTHROCYTE [DISTWIDTH] IN BLOOD BY AUTOMATED COUNT: 13 % (ref 10–15)
GLUCOSE 1H P 50 G GLC PO SERPL-MCNC: 159 MG/DL (ref 70–129)
HCT VFR BLD AUTO: 36.9 % (ref 35–47)
HGB BLD-MCNC: 12.7 G/DL (ref 11.7–15.7)
MCH RBC QN AUTO: 31.4 PG (ref 26.5–33)
MCHC RBC AUTO-ENTMCNC: 34.4 G/DL (ref 31.5–36.5)
MCV RBC AUTO: 91 FL (ref 78–100)
PLATELET # BLD AUTO: 270 10E3/UL (ref 150–450)
RBC # BLD AUTO: 4.05 10E6/UL (ref 3.8–5.2)
WBC # BLD AUTO: 8.1 10E3/UL (ref 4–11)

## 2024-04-03 PROCEDURE — 99207 PR PRENATAL VISIT: CPT | Performed by: OBSTETRICS & GYNECOLOGY

## 2024-04-03 PROCEDURE — 90715 TDAP VACCINE 7 YRS/> IM: CPT | Performed by: OBSTETRICS & GYNECOLOGY

## 2024-04-03 PROCEDURE — 90471 IMMUNIZATION ADMIN: CPT | Performed by: OBSTETRICS & GYNECOLOGY

## 2024-04-03 PROCEDURE — 82950 GLUCOSE TEST: CPT

## 2024-04-03 PROCEDURE — 36415 COLL VENOUS BLD VENIPUNCTURE: CPT

## 2024-04-03 PROCEDURE — 85027 COMPLETE CBC AUTOMATED: CPT

## 2024-04-03 NOTE — PROGRESS NOTES
Kasey Carrera is a 28 year old  at 27w3d     Denies LOF, VB, ctx. No FM.  Some low back, gluteus discomfort      ICD-10-CM    1. Supervision of high risk pregnancy in second trimester  O09.92       2. 27 weeks gestation of pregnancy  Z3A.27       3. Need for Tdap vaccination  Z23 TDAP 7+ (ADACEL,BOOSTRIX)        - First tri labs wnl.   - NIPT normal/Surprise; AFP: WNL  - Anatomy US 2024: EFW 65%; Anterior low-lying, Breech, sub-optimal heart views   : Recheck heart in 2 weeks:   Plan recheck growth and placental location at 30 weeks   - Low-lying placenta: Pelvic rest recommended. Gave lifting restriction note for work  - 3rd tri labs today  - GBS due at 36 weeks  - Reviewed Labor and PTL precautions, expected fetal movement and kick counts, PreE signs/symptoms  - Anxiety/Depression. On Citalopram 20mg/Wellbutrin 150mg. Digna Rodriguez at [facility].  - TW lbs, Pregravid BMI 29 Expect 25-35 lbs.  - Discussed exercised to do to help with low back discomfort. Also recommend maternity support belt.  - follow-up in 2-3 weeks    Dania Walker MD

## 2024-04-04 DIAGNOSIS — R73.02 IMPAIRED GLUCOSE TOLERANCE: Primary | ICD-10-CM

## 2024-04-10 ENCOUNTER — LAB (OUTPATIENT)
Dept: LAB | Facility: CLINIC | Age: 29
End: 2024-04-10
Payer: COMMERCIAL

## 2024-04-10 DIAGNOSIS — R73.02 IMPAIRED GLUCOSE TOLERANCE: ICD-10-CM

## 2024-04-10 LAB
GESTATIONAL GTT 1 HR POST DOSE: 138 MG/DL (ref 60–179)
GLUCOSE P FAST SERPL-MCNC: 85 MG/DL (ref 60–94)

## 2024-04-10 PROCEDURE — 82951 GLUCOSE TOLERANCE TEST (GTT): CPT

## 2024-04-10 PROCEDURE — 82952 GTT-ADDED SAMPLES: CPT

## 2024-04-10 PROCEDURE — 36415 COLL VENOUS BLD VENIPUNCTURE: CPT

## 2024-04-11 LAB
GESTATIONAL GTT 2 HR POST DOSE: 103 MG/DL (ref 60–154)
GESTATIONAL GTT 3 HR POST DOSE: 53 MG/DL (ref 60–139)

## 2024-04-24 ENCOUNTER — PRENATAL OFFICE VISIT (OUTPATIENT)
Dept: OBGYN | Facility: CLINIC | Age: 29
End: 2024-04-24
Attending: OBSTETRICS & GYNECOLOGY
Payer: COMMERCIAL

## 2024-04-24 ENCOUNTER — ANCILLARY PROCEDURE (OUTPATIENT)
Dept: ULTRASOUND IMAGING | Facility: CLINIC | Age: 29
End: 2024-04-24
Attending: OBSTETRICS & GYNECOLOGY
Payer: COMMERCIAL

## 2024-04-24 VITALS
WEIGHT: 202.4 LBS | DIASTOLIC BLOOD PRESSURE: 62 MMHG | BODY MASS INDEX: 29.98 KG/M2 | SYSTOLIC BLOOD PRESSURE: 115 MMHG | HEIGHT: 69 IN

## 2024-04-24 DIAGNOSIS — O44.40 LOW-LYING PLACENTA: ICD-10-CM

## 2024-04-24 DIAGNOSIS — O09.93 SUPERVISION OF HIGH RISK PREGNANCY IN THIRD TRIMESTER: Primary | ICD-10-CM

## 2024-04-24 DIAGNOSIS — Z3A.30 30 WEEKS GESTATION OF PREGNANCY: ICD-10-CM

## 2024-04-24 DIAGNOSIS — O09.92 SUPERVISION OF HIGH RISK PREGNANCY IN SECOND TRIMESTER: ICD-10-CM

## 2024-04-24 PROCEDURE — 76816 OB US FOLLOW-UP PER FETUS: CPT | Performed by: OBSTETRICS & GYNECOLOGY

## 2024-04-24 PROCEDURE — 99207 PR PRENATAL VISIT: CPT | Performed by: OBSTETRICS & GYNECOLOGY

## 2024-04-24 NOTE — PROGRESS NOTES
Kasey Carrera is a 28 year old  at 30w3d     Denies LOF, VB, ctx. No FM.  Some low back, gluteus discomfort      ICD-10-CM    1. Supervision of high risk pregnancy in third trimester  O09.93       2. Low-lying placenta  O44.40       3. 30 weeks gestation of pregnancy  Z3A.30           - First tri labs wnl.   - NIPT normal/Surprise; AFP: WNL  - Anatomy US 2024: EFW 65%; Anterior low-lying, Breech, sub-optimal heart views   : Recheck heart in 2 weeks: WNL   : EFW 1829g (81%), placenta: Anterior, MVP: 7.7, Vtx  - Low-lying placenta: Resolved  - 3rd tri labs: elevated 1 hr, 3 hr WNL; Hgb/Plts: WNL  - GBS due at 36 weeks  - Reviewed Labor and PTL precautions, expected fetal movement and kick counts, PreE signs/symptoms  - Anxiety/Depression. On Citalopram 20mg/Wellbutrin 150mg. Digna Rodriguez at [facility].  - TW lbs, Pregravid BMI 29 Expect 25-35 lbs.  - Follow-up in 2 weeks    Dania Walker MD     Results for orders placed or performed in visit on 24   US OB >14 Weeks Follow Up     Status: None    Narrative    Table formatting from the original result was not included.     US OB >14 Weeks Follow Up  Order #: [id number] Accession #: [id number]  Study Notes     Spurling, Brittnee on 2024  8:25 AM   a  Obstetrical Ultrasound Report  OB U/S Follow Up > 14 Weeks - Transabdominal  ealth [facility]  Referring physician: Dania Walker MD   Sonographer: Brittnee Spurling, RDMS  Indication:  F/U Growth     Dating (mm/dd/yyyy):   LMP: Patient's last menstrual period was 09/15/2023.               EDC:    Estimated Date of Delivery: 2024   GA by LMP:   30w3d  Current Scan On (mm/dd/yyyy):  2024                       EDC:   2024        GA by Current   Scan:      31w4d  The calculation of the gestational age by current scan was based on BPD,   HC, AC and FL.     Anatomy Scan:  Fuller gestation.  Visualized: 4 Chamber Heart, Stomach, and Bladder.  Biometry:  BPD 7.7 cm  31w0d 58%   HC 28.9 cm 31w5d 52%   AC 28.0 cm 32w0d 88%   FL 6.0 cm 31w3d 64%   EFW (lbs/oz) 4 baa4xgb       EFW (g) 1829 g 81%        Fetal heart rate: 143 bpm  Fetal presentation: Cephalic  Amniotic fluid: 7.7 cm MVP  Placenta: Anterior , no previa, > 2 cm from internal os  Maternal Anatomy:  Right adnexa: wnl  Left adnexa: wnl  Technique: Transabdominal Imaging performed  Impression:       Growth and anatomy survey appears normal.  Growth is appropriate for gestational age.  EFW by today's ultrasound is 1829 grams, which is the 81%tile.  Normal MVP, vertex presentation.  Placental location is now within normal limits.  No previa or low lying   placenta visualized.    Dania Wlaker MD

## 2024-05-04 NOTE — PROGRESS NOTES
Kasey Carrera is a 28 year old  at 32w1d     Denies LOF, VB, ctx. + FM. Reporting occasional BH  Reporting some discomfort after sex.      ICD-10-CM    1. Supervision of high risk pregnancy in third trimester  O09.93       2. 32 weeks gestation of pregnancy  Z3A.32       3. Depression affecting pregnancy, antepartum  O99.340     F32.A           - Sample Uberlube given.  - First tri labs wnl.   - NIPT normal/Surprise; AFP: WNL  - Anatomy US 2024: EFW 65%; Anterior low-lying, Breech, sub-optimal heart views   : Recheck heart in 2 weeks: WNL   : EFW 1829g (81%), placenta: Anterior, MVP: 7.7, Vtx  - Low-lying placenta: Resolved  - 3rd tri labs: elevated 1 hr, 3 hr WNL; Hgb/Plts: WNL  - GBS due at 36 weeks  - Reviewed Labor and PTL precautions, expected fetal movement and kick counts, PreE signs/symptoms  - Anxiety/Depression. On Citalopram 20mg/Wellbutrin 150mg. Digna Rodriguez at [facility].  - TW lbs, Pregravid BMI 29 Expect 25-35 lbs.  - Follow-up in 2 weeks    Dania Walker MD

## 2024-05-06 ENCOUNTER — PRENATAL OFFICE VISIT (OUTPATIENT)
Dept: OBGYN | Facility: CLINIC | Age: 29
End: 2024-05-06
Payer: COMMERCIAL

## 2024-05-06 VITALS
BODY MASS INDEX: 30.69 KG/M2 | DIASTOLIC BLOOD PRESSURE: 62 MMHG | SYSTOLIC BLOOD PRESSURE: 110 MMHG | HEIGHT: 69 IN | WEIGHT: 207.2 LBS

## 2024-05-06 DIAGNOSIS — O09.93 SUPERVISION OF HIGH RISK PREGNANCY IN THIRD TRIMESTER: Primary | ICD-10-CM

## 2024-05-06 DIAGNOSIS — O99.340 DEPRESSION AFFECTING PREGNANCY, ANTEPARTUM: ICD-10-CM

## 2024-05-06 DIAGNOSIS — F32.A DEPRESSION AFFECTING PREGNANCY, ANTEPARTUM: ICD-10-CM

## 2024-05-06 DIAGNOSIS — Z3A.32 32 WEEKS GESTATION OF PREGNANCY: ICD-10-CM

## 2024-05-06 PROCEDURE — 99207 PR PRENATAL VISIT: CPT | Performed by: OBSTETRICS & GYNECOLOGY

## 2024-05-22 ENCOUNTER — PRENATAL OFFICE VISIT (OUTPATIENT)
Dept: OBGYN | Facility: CLINIC | Age: 29
End: 2024-05-22
Payer: COMMERCIAL

## 2024-05-22 VITALS
SYSTOLIC BLOOD PRESSURE: 116 MMHG | DIASTOLIC BLOOD PRESSURE: 70 MMHG | BODY MASS INDEX: 30.66 KG/M2 | HEIGHT: 69 IN | WEIGHT: 207 LBS

## 2024-05-22 DIAGNOSIS — Z3A.34 34 WEEKS GESTATION OF PREGNANCY: ICD-10-CM

## 2024-05-22 DIAGNOSIS — O09.93 SUPERVISION OF HIGH RISK PREGNANCY IN THIRD TRIMESTER: Primary | ICD-10-CM

## 2024-05-22 PROCEDURE — 99207 PR PRENATAL VISIT: CPT | Performed by: OBSTETRICS & GYNECOLOGY

## 2024-05-22 NOTE — PROGRESS NOTES
Kasey Carrera is a 28 year old  at 34w3d     Denies LOF, VB, ctx. + FM. Reporting occasional BH  Noting more vaginal/rectal pressure  Doesn't feel like she is constipated.        ICD-10-CM    1. Supervision of high risk pregnancy in third trimester  O09.93       2. 34 weeks gestation of pregnancy  Z3A.34           - Sample Uberlube given.  - First tri labs wnl.   - NIPT normal/Surprise; AFP: WNL  - Anatomy US 2024: EFW 65%; Anterior low-lying, Breech, sub-optimal heart views   : Recheck heart in 2 weeks: WNL   : EFW 1829g (81%), placenta: Anterior, MVP: 7.7, Vtx  - Low-lying placenta: Resolved  - 3rd tri labs: elevated 1 hr, 3 hr WNL; Hgb/Plts: WNL  - GBS due at 36 weeks  - Reviewed Labor and PTL precautions, expected fetal movement and kick counts, PreE signs/symptoms  - Anxiety/Depression. On Citalopram 20mg/Wellbutrin 150mg. Digna Rodriguez at [facility].  - TW lbs, Pregravid BMI 29 Expect 25-35 lbs.  - Follow-up in 2 weeks    Dania Walker MD

## 2024-05-31 ENCOUNTER — MEDICAL CORRESPONDENCE (OUTPATIENT)
Dept: HEALTH INFORMATION MANAGEMENT | Facility: CLINIC | Age: 29
End: 2024-05-31
Payer: COMMERCIAL

## 2024-06-02 ENCOUNTER — HOSPITAL ENCOUNTER (INPATIENT)
Facility: CLINIC | Age: 29
LOS: 5 days | Discharge: HOME | End: 2024-06-07
Attending: OBSTETRICS & GYNECOLOGY | Admitting: OBSTETRICS & GYNECOLOGY
Payer: COMMERCIAL

## 2024-06-02 ENCOUNTER — NURSE TRIAGE (OUTPATIENT)
Dept: NURSING | Facility: CLINIC | Age: 29
End: 2024-06-02
Payer: COMMERCIAL

## 2024-06-02 DIAGNOSIS — Z98.891 S/P PRIMARY LOW TRANSVERSE C-SECTION: ICD-10-CM

## 2024-06-02 PROBLEM — Z36.89 ENCOUNTER FOR TRIAGE IN PREGNANT PATIENT: Status: ACTIVE | Noted: 2024-06-02

## 2024-06-02 PROBLEM — O46.90 VAGINAL BLEEDING IN PREGNANCY: Status: ACTIVE | Noted: 2024-06-02

## 2024-06-02 LAB
ABO/RH(D): NORMAL
ANTIBODY SCREEN: NEGATIVE
CRYSTALS AMN MICRO: ABNORMAL
ERYTHROCYTE [DISTWIDTH] IN BLOOD BY AUTOMATED COUNT: 12.8 % (ref 10–15)
HCT VFR BLD AUTO: 34.7 % (ref 35–47)
HGB BLD-MCNC: 11.6 G/DL (ref 11.7–15.7)
HOLD SPECIMEN: NORMAL
HOLD SPECIMEN: NORMAL
MCH RBC QN AUTO: 30 PG (ref 26.5–33)
MCHC RBC AUTO-ENTMCNC: 33.4 G/DL (ref 31.5–36.5)
MCV RBC AUTO: 90 FL (ref 78–100)
PLATELET # BLD AUTO: 290 10E3/UL (ref 150–450)
RBC # BLD AUTO: 3.87 10E6/UL (ref 3.8–5.2)
SPECIMEN EXPIRATION DATE: NORMAL
WBC # BLD AUTO: 8.7 10E3/UL (ref 4–11)

## 2024-06-02 PROCEDURE — 120N000001 HC R&B MED SURG/OB

## 2024-06-02 PROCEDURE — 87653 STREP B DNA AMP PROBE: CPT | Performed by: OBSTETRICS & GYNECOLOGY

## 2024-06-02 PROCEDURE — 59025 FETAL NON-STRESS TEST: CPT

## 2024-06-02 PROCEDURE — G0463 HOSPITAL OUTPT CLINIC VISIT: HCPCS | Mod: 25

## 2024-06-02 PROCEDURE — 85027 COMPLETE CBC AUTOMATED: CPT | Performed by: OBSTETRICS & GYNECOLOGY

## 2024-06-02 PROCEDURE — 86900 BLOOD TYPING SEROLOGIC ABO: CPT | Performed by: OBSTETRICS & GYNECOLOGY

## 2024-06-02 PROCEDURE — 250N000013 HC RX MED GY IP 250 OP 250 PS 637: Performed by: OBSTETRICS & GYNECOLOGY

## 2024-06-02 PROCEDURE — 86780 TREPONEMA PALLIDUM: CPT | Performed by: OBSTETRICS & GYNECOLOGY

## 2024-06-02 PROCEDURE — 36415 COLL VENOUS BLD VENIPUNCTURE: CPT | Performed by: OBSTETRICS & GYNECOLOGY

## 2024-06-02 PROCEDURE — 99221 1ST HOSP IP/OBS SF/LOW 40: CPT | Performed by: OBSTETRICS & GYNECOLOGY

## 2024-06-02 RX ORDER — ONDANSETRON 4 MG/1
4 TABLET, ORALLY DISINTEGRATING ORAL EVERY 6 HOURS PRN
Status: DISCONTINUED | OUTPATIENT
Start: 2024-06-02 | End: 2024-06-02 | Stop reason: HOSPADM

## 2024-06-02 RX ORDER — MISOPROSTOL 200 UG/1
400 TABLET ORAL
Status: DISCONTINUED | OUTPATIENT
Start: 2024-06-02 | End: 2024-06-05 | Stop reason: HOSPADM

## 2024-06-02 RX ORDER — METOCLOPRAMIDE HYDROCHLORIDE 5 MG/ML
10 INJECTION INTRAMUSCULAR; INTRAVENOUS EVERY 6 HOURS PRN
Status: DISCONTINUED | OUTPATIENT
Start: 2024-06-02 | End: 2024-06-02 | Stop reason: HOSPADM

## 2024-06-02 RX ORDER — METOCLOPRAMIDE 10 MG/1
10 TABLET ORAL EVERY 6 HOURS PRN
Status: DISCONTINUED | OUTPATIENT
Start: 2024-06-02 | End: 2024-06-02 | Stop reason: HOSPADM

## 2024-06-02 RX ORDER — CITALOPRAM HYDROBROMIDE 20 MG/1
20 TABLET ORAL DAILY
Status: DISCONTINUED | OUTPATIENT
Start: 2024-06-03 | End: 2024-06-02

## 2024-06-02 RX ORDER — PROCHLORPERAZINE 25 MG
25 SUPPOSITORY, RECTAL RECTAL EVERY 12 HOURS PRN
Status: DISCONTINUED | OUTPATIENT
Start: 2024-06-02 | End: 2024-06-02 | Stop reason: HOSPADM

## 2024-06-02 RX ORDER — ONDANSETRON 2 MG/ML
4 INJECTION INTRAMUSCULAR; INTRAVENOUS EVERY 6 HOURS PRN
Status: DISCONTINUED | OUTPATIENT
Start: 2024-06-02 | End: 2024-06-02 | Stop reason: HOSPADM

## 2024-06-02 RX ORDER — BUPROPION HYDROCHLORIDE 150 MG/1
300 TABLET ORAL EVERY MORNING
Status: DISCONTINUED | OUTPATIENT
Start: 2024-06-03 | End: 2024-06-07 | Stop reason: HOSPADM

## 2024-06-02 RX ORDER — MISOPROSTOL 200 UG/1
800 TABLET ORAL
Status: DISCONTINUED | OUTPATIENT
Start: 2024-06-02 | End: 2024-06-05 | Stop reason: HOSPADM

## 2024-06-02 RX ORDER — HYDROXYZINE HYDROCHLORIDE 50 MG/1
50 TABLET, FILM COATED ORAL EVERY 6 HOURS PRN
Status: DISCONTINUED | OUTPATIENT
Start: 2024-06-02 | End: 2024-06-02 | Stop reason: HOSPADM

## 2024-06-02 RX ORDER — OXYTOCIN/0.9 % SODIUM CHLORIDE 30/500 ML
340 PLASTIC BAG, INJECTION (ML) INTRAVENOUS CONTINUOUS PRN
Status: DISCONTINUED | OUTPATIENT
Start: 2024-06-02 | End: 2024-06-05 | Stop reason: HOSPADM

## 2024-06-02 RX ORDER — OXYTOCIN 10 [USP'U]/ML
10 INJECTION, SOLUTION INTRAMUSCULAR; INTRAVENOUS
Status: COMPLETED | OUTPATIENT
Start: 2024-06-02 | End: 2024-06-05

## 2024-06-02 RX ORDER — METHYLERGONOVINE MALEATE 0.2 MG/ML
200 INJECTION INTRAVENOUS
Status: DISCONTINUED | OUTPATIENT
Start: 2024-06-02 | End: 2024-06-05 | Stop reason: HOSPADM

## 2024-06-02 RX ORDER — CARBOPROST TROMETHAMINE 250 UG/ML
250 INJECTION, SOLUTION INTRAMUSCULAR
Status: DISCONTINUED | OUTPATIENT
Start: 2024-06-02 | End: 2024-06-05 | Stop reason: HOSPADM

## 2024-06-02 RX ORDER — BUPROPION HYDROCHLORIDE 300 MG/1
300 TABLET ORAL DAILY
Status: DISCONTINUED | OUTPATIENT
Start: 2024-06-03 | End: 2024-06-02

## 2024-06-02 RX ORDER — LOPERAMIDE HCL 2 MG
4 CAPSULE ORAL
Status: DISCONTINUED | OUTPATIENT
Start: 2024-06-02 | End: 2024-06-05 | Stop reason: HOSPADM

## 2024-06-02 RX ORDER — PROCHLORPERAZINE MALEATE 5 MG
10 TABLET ORAL EVERY 6 HOURS PRN
Status: DISCONTINUED | OUTPATIENT
Start: 2024-06-02 | End: 2024-06-05 | Stop reason: HOSPADM

## 2024-06-02 RX ORDER — OXYTOCIN 10 [USP'U]/ML
10 INJECTION, SOLUTION INTRAMUSCULAR; INTRAVENOUS
Status: DISCONTINUED | OUTPATIENT
Start: 2024-06-02 | End: 2024-06-07 | Stop reason: HOSPADM

## 2024-06-02 RX ORDER — ZOLPIDEM TARTRATE 5 MG/1
5 TABLET ORAL
Status: DISCONTINUED | OUTPATIENT
Start: 2024-06-02 | End: 2024-06-02 | Stop reason: HOSPADM

## 2024-06-02 RX ORDER — LIDOCAINE 40 MG/G
CREAM TOPICAL
Status: DISCONTINUED | OUTPATIENT
Start: 2024-06-02 | End: 2024-06-02 | Stop reason: HOSPADM

## 2024-06-02 RX ORDER — NALOXONE HYDROCHLORIDE 0.4 MG/ML
0.2 INJECTION, SOLUTION INTRAMUSCULAR; INTRAVENOUS; SUBCUTANEOUS
Status: DISCONTINUED | OUTPATIENT
Start: 2024-06-02 | End: 2024-06-05 | Stop reason: HOSPADM

## 2024-06-02 RX ORDER — FENTANYL CITRATE 50 UG/ML
100 INJECTION, SOLUTION INTRAMUSCULAR; INTRAVENOUS
Status: DISCONTINUED | OUTPATIENT
Start: 2024-06-02 | End: 2024-06-05 | Stop reason: HOSPADM

## 2024-06-02 RX ORDER — ACETAMINOPHEN 325 MG/1
650 TABLET ORAL EVERY 4 HOURS PRN
Status: DISCONTINUED | OUTPATIENT
Start: 2024-06-02 | End: 2024-06-02 | Stop reason: HOSPADM

## 2024-06-02 RX ORDER — CITRIC ACID/SODIUM CITRATE 334-500MG
30 SOLUTION, ORAL ORAL
Status: DISCONTINUED | OUTPATIENT
Start: 2024-06-02 | End: 2024-06-05 | Stop reason: HOSPADM

## 2024-06-02 RX ORDER — PENICILLIN G POTASSIUM 5000000 [IU]/1
5 INJECTION, POWDER, FOR SOLUTION INTRAMUSCULAR; INTRAVENOUS ONCE
Status: DISCONTINUED | OUTPATIENT
Start: 2024-06-02 | End: 2024-06-03

## 2024-06-02 RX ORDER — SODIUM CHLORIDE, SODIUM LACTATE, POTASSIUM CHLORIDE, CALCIUM CHLORIDE 600; 310; 30; 20 MG/100ML; MG/100ML; MG/100ML; MG/100ML
INJECTION, SOLUTION INTRAVENOUS CONTINUOUS
Status: DISCONTINUED | OUTPATIENT
Start: 2024-06-02 | End: 2024-06-05 | Stop reason: HOSPADM

## 2024-06-02 RX ORDER — LOPERAMIDE HCL 2 MG
2 CAPSULE ORAL
Status: DISCONTINUED | OUTPATIENT
Start: 2024-06-02 | End: 2024-06-05 | Stop reason: HOSPADM

## 2024-06-02 RX ORDER — METOCLOPRAMIDE HYDROCHLORIDE 5 MG/ML
10 INJECTION INTRAMUSCULAR; INTRAVENOUS EVERY 6 HOURS PRN
Status: DISCONTINUED | OUTPATIENT
Start: 2024-06-02 | End: 2024-06-05 | Stop reason: HOSPADM

## 2024-06-02 RX ORDER — NALOXONE HYDROCHLORIDE 0.4 MG/ML
0.4 INJECTION, SOLUTION INTRAMUSCULAR; INTRAVENOUS; SUBCUTANEOUS
Status: DISCONTINUED | OUTPATIENT
Start: 2024-06-02 | End: 2024-06-05 | Stop reason: HOSPADM

## 2024-06-02 RX ORDER — ONDANSETRON 4 MG/1
4 TABLET, ORALLY DISINTEGRATING ORAL EVERY 8 HOURS PRN
Status: DISCONTINUED | OUTPATIENT
Start: 2024-06-02 | End: 2024-06-02

## 2024-06-02 RX ORDER — PROCHLORPERAZINE MALEATE 5 MG
10 TABLET ORAL EVERY 6 HOURS PRN
Status: DISCONTINUED | OUTPATIENT
Start: 2024-06-02 | End: 2024-06-02 | Stop reason: HOSPADM

## 2024-06-02 RX ORDER — KETOROLAC TROMETHAMINE 30 MG/ML
30 INJECTION, SOLUTION INTRAMUSCULAR; INTRAVENOUS
Status: DISCONTINUED | OUTPATIENT
Start: 2024-06-02 | End: 2024-06-07 | Stop reason: HOSPADM

## 2024-06-02 RX ORDER — PROCHLORPERAZINE 25 MG
25 SUPPOSITORY, RECTAL RECTAL EVERY 12 HOURS PRN
Status: DISCONTINUED | OUTPATIENT
Start: 2024-06-02 | End: 2024-06-05 | Stop reason: HOSPADM

## 2024-06-02 RX ORDER — PENICILLIN G 3000000 [IU]/50ML
3 INJECTION, SOLUTION INTRAVENOUS EVERY 4 HOURS
Status: DISCONTINUED | OUTPATIENT
Start: 2024-06-03 | End: 2024-06-03

## 2024-06-02 RX ORDER — TRANEXAMIC ACID 10 MG/ML
1 INJECTION, SOLUTION INTRAVENOUS EVERY 30 MIN PRN
Status: DISCONTINUED | OUTPATIENT
Start: 2024-06-02 | End: 2024-06-05 | Stop reason: HOSPADM

## 2024-06-02 RX ORDER — IBUPROFEN 400 MG/1
800 TABLET, FILM COATED ORAL
Status: DISCONTINUED | OUTPATIENT
Start: 2024-06-02 | End: 2024-06-07 | Stop reason: HOSPADM

## 2024-06-02 RX ORDER — METOCLOPRAMIDE 10 MG/1
10 TABLET ORAL EVERY 6 HOURS PRN
Status: DISCONTINUED | OUTPATIENT
Start: 2024-06-02 | End: 2024-06-05 | Stop reason: HOSPADM

## 2024-06-02 RX ORDER — MISOPROSTOL 100 UG/1
25 TABLET ORAL
Status: COMPLETED | OUTPATIENT
Start: 2024-06-02 | End: 2024-06-03

## 2024-06-02 RX ORDER — OXYTOCIN/0.9 % SODIUM CHLORIDE 30/500 ML
100-340 PLASTIC BAG, INJECTION (ML) INTRAVENOUS CONTINUOUS PRN
Status: DISCONTINUED | OUTPATIENT
Start: 2024-06-02 | End: 2024-06-07 | Stop reason: HOSPADM

## 2024-06-02 RX ORDER — ONDANSETRON 2 MG/ML
4 INJECTION INTRAMUSCULAR; INTRAVENOUS EVERY 6 HOURS PRN
Status: DISCONTINUED | OUTPATIENT
Start: 2024-06-02 | End: 2024-06-05 | Stop reason: HOSPADM

## 2024-06-02 RX ORDER — ONDANSETRON 4 MG/1
4 TABLET, ORALLY DISINTEGRATING ORAL EVERY 6 HOURS PRN
Status: DISCONTINUED | OUTPATIENT
Start: 2024-06-02 | End: 2024-06-05 | Stop reason: HOSPADM

## 2024-06-02 RX ORDER — DOCUSATE SODIUM 100 MG/1
100 CAPSULE, LIQUID FILLED ORAL 2 TIMES DAILY
Status: DISCONTINUED | OUTPATIENT
Start: 2024-06-02 | End: 2024-06-02 | Stop reason: HOSPADM

## 2024-06-02 RX ORDER — LIDOCAINE 40 MG/G
CREAM TOPICAL
Status: DISCONTINUED | OUTPATIENT
Start: 2024-06-02 | End: 2024-06-05 | Stop reason: HOSPADM

## 2024-06-02 RX ADMIN — Medication 25 MCG: at 22:13

## 2024-06-02 ASSESSMENT — ACTIVITIES OF DAILY LIVING (ADL)
ADLS_ACUITY_SCORE: 20

## 2024-06-02 NOTE — TELEPHONE ENCOUNTER
"OB Triage Call    Is patient's OB/Midwife with [facility]? [facility]- Proceed with triage     Reason for call: Vaginal bleeding    Assessment: Reports continuous amounts of bld pouring out of the vagina. Reports it just started right before she called it.     Plan: Call EMS/911 Now. She is not alone and has someone there with her to help her call.      Patient's primary OB Provider is Dr. Walker.      Per protocol recommendations: Call 911 now      36w0d    Estimated Date of Delivery: 2024        OB History    Para Term  AB Living   3 0 0 0 2 0   SAB IAB Ectopic Multiple Live Births   2 0 0 0 0      # Outcome Date GA Lbr Salvatore/2nd Weight Sex Type Anes PTL Lv   3 Current            2 2022           1 2021               No results found for: \"GBS\"       Antonia Angeles RN   Additional Information   Negative: Passed out (i.e., lost consciousness, collapsed and was not responding)   Negative: Shock suspected (e.g., cold/pale/clammy skin, too weak to stand, low BP, rapid pulse)    Protocols used: Pregnancy - Vaginal Bleeding Greater Than 20 Weeks EGA-A-DILMA Angeles RN on 2024 at 3:51 PM    "

## 2024-06-02 NOTE — PROVIDER NOTIFICATION
06/02/24 1830   Provider Notification   Provider Name/Title Dr Long   Method of Notification At Bedside   Request Evaluate in Person   Notification Reason SVE;Status Update;Bleeding;Lab/Diagnostic Study     Dr Long at bedside, reviewed pt's history and symptoms.  Plan to continue to monitor. + accels noted.  Dr Long to return to collect Rom+, GBS and check fetal presentation with bedside ultrasound.  Pt denies feeling pain or ctx.  Pad checked and no further bleeding noted at this time.  Pt and spouse in agreement with POC.  Will continue to monitor and assess.

## 2024-06-02 NOTE — PROVIDER NOTIFICATION
06/02/24 4768   Provider Notification   Provider Name/Title Dr Long   Method of Notification Phone   Request Evaluate - Remote   Notification Reason Patient Arrived;SVE;Status Update     Phone call to Dr Long to discuss maternal/fetal well-being.  Orders given for labs and rom+.  Dr Long en route to hospital to assess pt.  Will continue to monitor and assess.

## 2024-06-03 LAB
GP B STREP DNA SPEC QL NAA+PROBE: NEGATIVE
T PALLIDUM AB SER QL: NONREACTIVE

## 2024-06-03 PROCEDURE — 250N000013 HC RX MED GY IP 250 OP 250 PS 637: Performed by: OBSTETRICS & GYNECOLOGY

## 2024-06-03 PROCEDURE — 258N000003 HC RX IP 258 OP 636: Performed by: OBSTETRICS & GYNECOLOGY

## 2024-06-03 PROCEDURE — 250N000011 HC RX IP 250 OP 636: Performed by: OBSTETRICS & GYNECOLOGY

## 2024-06-03 PROCEDURE — 99231 SBSQ HOSP IP/OBS SF/LOW 25: CPT | Performed by: OBSTETRICS & GYNECOLOGY

## 2024-06-03 PROCEDURE — 120N000001 HC R&B MED SURG/OB

## 2024-06-03 RX ORDER — HYDROXYZINE HYDROCHLORIDE 50 MG/1
50 TABLET, FILM COATED ORAL EVERY 6 HOURS PRN
Status: DISCONTINUED | OUTPATIENT
Start: 2024-06-03 | End: 2024-06-07 | Stop reason: HOSPADM

## 2024-06-03 RX ORDER — CEFAZOLIN SODIUM 2 G/100ML
2 INJECTION, SOLUTION INTRAVENOUS EVERY 6 HOURS
Status: DISCONTINUED | OUTPATIENT
Start: 2024-06-03 | End: 2024-06-04

## 2024-06-03 RX ADMIN — Medication 25 MCG: at 18:32

## 2024-06-03 RX ADMIN — Medication 25 MCG: at 14:34

## 2024-06-03 RX ADMIN — Medication 25 MCG: at 20:29

## 2024-06-03 RX ADMIN — CEFAZOLIN SODIUM 2 G: 2 INJECTION, SOLUTION INTRAVENOUS at 11:25

## 2024-06-03 RX ADMIN — Medication 25 MCG: at 04:23

## 2024-06-03 RX ADMIN — Medication 25 MCG: at 10:30

## 2024-06-03 RX ADMIN — CITALOPRAM HYDROBROMIDE 30 MG: 20 TABLET ORAL at 08:28

## 2024-06-03 RX ADMIN — HYDROXYZINE HYDROCHLORIDE 25 MG: 25 TABLET, FILM COATED ORAL at 23:45

## 2024-06-03 RX ADMIN — Medication 25 MCG: at 16:27

## 2024-06-03 RX ADMIN — DINOPROSTONE 10 MG: 10 INSERT VAGINAL at 22:00

## 2024-06-03 RX ADMIN — Medication 25 MCG: at 08:25

## 2024-06-03 RX ADMIN — SODIUM CHLORIDE, POTASSIUM CHLORIDE, SODIUM LACTATE AND CALCIUM CHLORIDE: 600; 310; 30; 20 INJECTION, SOLUTION INTRAVENOUS at 23:44

## 2024-06-03 RX ADMIN — CEFAZOLIN SODIUM 2 G: 2 INJECTION, SOLUTION INTRAVENOUS at 17:33

## 2024-06-03 RX ADMIN — CEFAZOLIN SODIUM 2 G: 2 INJECTION, SOLUTION INTRAVENOUS at 23:41

## 2024-06-03 RX ADMIN — Medication 25 MCG: at 00:16

## 2024-06-03 RX ADMIN — BUPROPION HYDROCHLORIDE 300 MG: 300 TABLET, EXTENDED RELEASE ORAL at 08:27

## 2024-06-03 RX ADMIN — Medication 25 MCG: at 12:28

## 2024-06-03 RX ADMIN — Medication 25 MCG: at 06:24

## 2024-06-03 RX ADMIN — Medication 25 MCG: at 02:15

## 2024-06-03 ASSESSMENT — ACTIVITIES OF DAILY LIVING (ADL)
ADLS_ACUITY_SCORE: 20

## 2024-06-03 NOTE — H&P
"OB Observation H&P    HPI:  29yo  brought to [facility] by ambulance for vaginal bleeding at 36wks.  Had completely normal day and went to the bathroom around 3:40PM and reported large amount of blood in the toilet bowl.  After, continued to have light bleeding and called 911 at recommendation of nurse line.  Has  not had any previous bleeding in this pregnancy.  Had prior low lying placenta which resolved at 30wks.  No cramping or contractions.  Has not felt any further gushes of fluid or bleeding since being at the hospital.  +FM.  Otherwise feeling well.  No n/v.  Has had some constipation over the last few weeks.  Was sexually active in the last couple of nights --no bleeding with sex.    PMHx:    Depression/Anxiety    PSHx:   ACL repair  Appendectomy  D&C for miscarriage in  and     Meds:   Wellbutrinn XL 150mg   Celexa 20mg  PNV  Zofran prn    All: amoxicillin, prozac, zoloft    Social Hx:  ([place]); works as therapist; no smoking/Etoh/drug use    Fam Hx: non contributory    ROS: no fever/chills  No headache, blurred vision, tingling, numbness  No chest pain, shortness of breath  No n/v, diarrhea, +constipation  No dysuria    Labs: hgb 11.6g/dL, plts 290  O+  GBS pend  ROM+ -obscured by blood    PE: AF,   Gen: pleasant, alert, NAD  Chest: unlabored breathing  Cardio: RRR  Abd: soft, nontender  Cervix: cl/th/-3 per RN; \"dimple of cervix noted\"  SSE: no pooling; small amount of old blood noted in vault; no active bleeding  Bedside US: vertex  FHT: 120s; mod variability +accels, no decels  [place]: quiet    A/P: 29yo  admitted for observation for vaginal bleeding at 36wks  --continuous monitoring overnight  ---monitor bleeding overnight  --if bleeding were to worsen or baby shows any signs of distress, would move towards delivery by  section  --monitor closely for any additional signs of SROM;no pooling or expression of fluid noted on SSE so very low suspicion clinically  --GBS pend  --Dr." Denise aware of admission and will see/evaluate in AM    **Addendum**  due to blood, ROM+ not able to be intepreted on 2 collections so fern test recommended and found to be positive; will move to labor room and start cervical ripening given very unfavorable cervix on admission; continue close monitoring for worsening of bleeding or signs of fetal distress at which time we would need to discontinue cervical ripening and move to c/s.  Will start IV penicillin with active labor due to GBS unknown status and GA <37wks    Hellen Long MD

## 2024-06-03 NOTE — PROGRESS NOTES
[facility]    Intrapartum Progress Note    S: Patient admitted yesterday. Initially was admitted for observation due to episode of unexplained vaginal bleeding.  The discharge did appear somewhat watery, so ROM+ collected, but unable to be run due to blood. Due to unknown origin of the bleeding patient was admitted for observation and ultimately a fern was able to be performed and was positive.  Patient was therefor started on IOL process due to PPROM.  Additionally patient started on PCN due to  and unknown GBS status.    ROM @ 1600 on     This AM she is doing well.   Feeling mild irregular contractions  Reports good fetal movement  Occasional watery, slightly blood tinged discharge. Significantly less than when arrived yesterday      O:    Physical Exam   Temp: 97.4  F (36.3  C) Temp src: Temporal BP: 121/73 Pulse: 72   Resp: 16   O2 Device: None (Room air)    Vitals:    24 1645   Weight: 93.9 kg (207 lb)     Vital Signs with Ranges  Temp:  [97.1  F (36.2  C)-97.9  F (36.6  C)] 97.4  F (36.3  C)  Pulse:  [72] 72  Resp:  [16] 16  BP: (117-129)/(70-74) 121/73  No intake/output data recorded.    Exam:  General: alert, coherent    SVE per nursing: dilated to 0.5, effaced 10%, and Station -4  FHR: 125 moderate variability, category 1  [place]: none      Data   Recent Labs   Lab Test 24  1806   AS Negative     Recent Labs   Lab Test 24  1806 24  1042   HGB 11.6* 12.7     Recent Labs   Lab Test 23  0812   RUQIGG No detectable antibody.       A:   -intrauterine pregnancy at 36w1d  -PPROM    Plan:  -Continue oral Cytotec for now.  If still unfavorable this evening transition to Cervidil.  -PCN for GBS prophylaxis  -Rubella non-immune: will need MMR postpartum  -Pain medications as needed  -Anticipate     Dania Walker MD on 6/3/2024 at 7:29 AM

## 2024-06-03 NOTE — PROGRESS NOTES
This RN received a call from lab stating the ROM+ sent had resulted in a faint positive line, but the specimen was still very bloody so the  was going to report it as negative since it could be a false positive. Suggested sending a fern. Dr. Long paged and updated- received orders for a fern to be collected.

## 2024-06-03 NOTE — PROGRESS NOTES
Dr. Long at bedside for speculum exam. Additional ROM+ sent and GBS collected. Verified head down by ultrasound. Patient denies pain, is feeling some lower back cramping and occasional abdominal tightening. Scant amount of bleeding noted on pad, new pad given. Plan is to monitor patient over night for bleeding, continuous fetal monitoring, and reassess status in AM. Patient moved to room 229, report given to Suzi ASENCIO RN.

## 2024-06-03 NOTE — CARE PLAN
Pt initially admitted to 228 for observation. Verbal consent for continuous EFM. Prenatal records reviewed and admission completed.  While admitting pt, Fern results came back positive.  MD notified and orders for admission were obtained.   Pt moved to 214 for cervical ripening. Report to MARTHA Thomas.

## 2024-06-03 NOTE — PROVIDER NOTIFICATION
Dr. Walker paged and updated via phone re: patient GBS postiive and antibiotic not yet started due to not being in active labor yet.  MD ws under impression that antibiotic had been started.  MD informed of patient allergy to Amoxicillin as well.  Antibiotic order changed to Ancef 2gm q6 hours.

## 2024-06-04 ENCOUNTER — ANESTHESIA (OUTPATIENT)
Dept: OBGYN | Facility: CLINIC | Age: 29
End: 2024-06-04
Payer: COMMERCIAL

## 2024-06-04 ENCOUNTER — ANESTHESIA EVENT (OUTPATIENT)
Dept: OBGYN | Facility: CLINIC | Age: 29
End: 2024-06-04
Payer: COMMERCIAL

## 2024-06-04 LAB
ALBUMIN SERPL BCG-MCNC: 3.2 G/DL (ref 3.5–5.2)
ALP SERPL-CCNC: 142 U/L (ref 40–150)
ALT SERPL W P-5'-P-CCNC: 12 U/L (ref 0–50)
ANION GAP SERPL CALCULATED.3IONS-SCNC: 11 MMOL/L (ref 7–15)
AST SERPL W P-5'-P-CCNC: 11 U/L (ref 0–45)
BILIRUB SERPL-MCNC: <0.2 MG/DL
BUN SERPL-MCNC: 4.3 MG/DL (ref 6–20)
CALCIUM SERPL-MCNC: 8.7 MG/DL (ref 8.6–10)
CHLORIDE SERPL-SCNC: 103 MMOL/L (ref 98–107)
CREAT SERPL-MCNC: 0.68 MG/DL (ref 0.51–0.95)
DEPRECATED HCO3 PLAS-SCNC: 23 MMOL/L (ref 22–29)
EGFRCR SERPLBLD CKD-EPI 2021: >90 ML/MIN/1.73M2
ERYTHROCYTE [DISTWIDTH] IN BLOOD BY AUTOMATED COUNT: 12.8 % (ref 10–15)
GLUCOSE SERPL-MCNC: 126 MG/DL (ref 70–99)
HCT VFR BLD AUTO: 34.2 % (ref 35–47)
HGB BLD-MCNC: 11.5 G/DL (ref 11.7–15.7)
MCH RBC QN AUTO: 29.8 PG (ref 26.5–33)
MCHC RBC AUTO-ENTMCNC: 33.6 G/DL (ref 31.5–36.5)
MCV RBC AUTO: 89 FL (ref 78–100)
PLATELET # BLD AUTO: 279 10E3/UL (ref 150–450)
POTASSIUM SERPL-SCNC: 4.3 MMOL/L (ref 3.4–5.3)
PROT SERPL-MCNC: 6.4 G/DL (ref 6.4–8.3)
RBC # BLD AUTO: 3.86 10E6/UL (ref 3.8–5.2)
SODIUM SERPL-SCNC: 137 MMOL/L (ref 135–145)
WBC # BLD AUTO: 10.6 10E3/UL (ref 4–11)

## 2024-06-04 PROCEDURE — 250N000011 HC RX IP 250 OP 636: Performed by: OBSTETRICS & GYNECOLOGY

## 2024-06-04 PROCEDURE — 250N000011 HC RX IP 250 OP 636: Mod: JZ | Performed by: OBSTETRICS & GYNECOLOGY

## 2024-06-04 PROCEDURE — 3E0P7VZ INTRODUCTION OF HORMONE INTO FEMALE REPRODUCTIVE, VIA NATURAL OR ARTIFICIAL OPENING: ICD-10-PCS | Performed by: OBSTETRICS & GYNECOLOGY

## 2024-06-04 PROCEDURE — 258N000003 HC RX IP 258 OP 636: Performed by: OBSTETRICS & GYNECOLOGY

## 2024-06-04 PROCEDURE — 250N000011 HC RX IP 250 OP 636: Performed by: ANESTHESIOLOGY

## 2024-06-04 PROCEDURE — 59510 CESAREAN DELIVERY: CPT | Performed by: ANESTHESIOLOGY

## 2024-06-04 PROCEDURE — 59510 CESAREAN DELIVERY: CPT | Performed by: NURSE ANESTHETIST, CERTIFIED REGISTERED

## 2024-06-04 PROCEDURE — 99231 SBSQ HOSP IP/OBS SF/LOW 25: CPT | Performed by: OBSTETRICS & GYNECOLOGY

## 2024-06-04 PROCEDURE — 250N000013 HC RX MED GY IP 250 OP 250 PS 637: Performed by: OBSTETRICS & GYNECOLOGY

## 2024-06-04 PROCEDURE — 80053 COMPREHEN METABOLIC PANEL: CPT | Performed by: STUDENT IN AN ORGANIZED HEALTH CARE EDUCATION/TRAINING PROGRAM

## 2024-06-04 PROCEDURE — 250N000009 HC RX 250: Performed by: OBSTETRICS & GYNECOLOGY

## 2024-06-04 PROCEDURE — 85027 COMPLETE CBC AUTOMATED: CPT | Performed by: STUDENT IN AN ORGANIZED HEALTH CARE EDUCATION/TRAINING PROGRAM

## 2024-06-04 PROCEDURE — 36415 COLL VENOUS BLD VENIPUNCTURE: CPT | Performed by: STUDENT IN AN ORGANIZED HEALTH CARE EDUCATION/TRAINING PROGRAM

## 2024-06-04 PROCEDURE — 120N000001 HC R&B MED SURG/OB

## 2024-06-04 RX ORDER — SODIUM CHLORIDE, SODIUM LACTATE, POTASSIUM CHLORIDE, CALCIUM CHLORIDE 600; 310; 30; 20 MG/100ML; MG/100ML; MG/100ML; MG/100ML
INJECTION, SOLUTION INTRAVENOUS CONTINUOUS PRN
Status: DISCONTINUED | OUTPATIENT
Start: 2024-06-04 | End: 2024-06-05 | Stop reason: HOSPADM

## 2024-06-04 RX ORDER — OXYTOCIN/0.9 % SODIUM CHLORIDE 30/500 ML
1-26 PLASTIC BAG, INJECTION (ML) INTRAVENOUS CONTINUOUS
Status: DISCONTINUED | OUTPATIENT
Start: 2024-06-04 | End: 2024-06-05 | Stop reason: HOSPADM

## 2024-06-04 RX ORDER — NALBUPHINE HYDROCHLORIDE 20 MG/ML
2.5-5 INJECTION, SOLUTION INTRAMUSCULAR; INTRAVENOUS; SUBCUTANEOUS EVERY 6 HOURS PRN
Status: DISCONTINUED | OUTPATIENT
Start: 2024-06-04 | End: 2024-06-07 | Stop reason: HOSPADM

## 2024-06-04 RX ORDER — FENTANYL CITRATE-0.9 % NACL/PF 10 MCG/ML
100 PLASTIC BAG, INJECTION (ML) INTRAVENOUS EVERY 5 MIN PRN
Status: DISCONTINUED | OUTPATIENT
Start: 2024-06-04 | End: 2024-06-05 | Stop reason: HOSPADM

## 2024-06-04 RX ADMIN — CEFAZOLIN SODIUM 2 G: 2 INJECTION, SOLUTION INTRAVENOUS at 05:21

## 2024-06-04 RX ADMIN — BUPROPION HYDROCHLORIDE 300 MG: 300 TABLET, EXTENDED RELEASE ORAL at 08:55

## 2024-06-04 RX ADMIN — CITALOPRAM HYDROBROMIDE 30 MG: 20 TABLET ORAL at 08:55

## 2024-06-04 RX ADMIN — SODIUM CHLORIDE, POTASSIUM CHLORIDE, SODIUM LACTATE AND CALCIUM CHLORIDE: 600; 310; 30; 20 INJECTION, SOLUTION INTRAVENOUS at 22:58

## 2024-06-04 RX ADMIN — SODIUM CHLORIDE, POTASSIUM CHLORIDE, SODIUM LACTATE AND CALCIUM CHLORIDE: 600; 310; 30; 20 INJECTION, SOLUTION INTRAVENOUS at 05:21

## 2024-06-04 RX ADMIN — CEFAZOLIN SODIUM 2 G: 2 INJECTION, SOLUTION INTRAVENOUS at 18:02

## 2024-06-04 RX ADMIN — Medication: at 23:52

## 2024-06-04 RX ADMIN — SODIUM CHLORIDE, POTASSIUM CHLORIDE, SODIUM LACTATE AND CALCIUM CHLORIDE: 600; 310; 30; 20 INJECTION, SOLUTION INTRAVENOUS at 15:49

## 2024-06-04 RX ADMIN — Medication 2 MILLI-UNITS/MIN: at 10:04

## 2024-06-04 RX ADMIN — METOCLOPRAMIDE 10 MG: 5 INJECTION, SOLUTION INTRAMUSCULAR; INTRAVENOUS at 23:24

## 2024-06-04 RX ADMIN — CEFAZOLIN SODIUM 2 G: 2 INJECTION, SOLUTION INTRAVENOUS at 11:28

## 2024-06-04 RX ADMIN — SODIUM CHLORIDE, POTASSIUM CHLORIDE, SODIUM LACTATE AND CALCIUM CHLORIDE 1000 ML: 600; 310; 30; 20 INJECTION, SOLUTION INTRAVENOUS at 23:04

## 2024-06-04 ASSESSMENT — ACTIVITIES OF DAILY LIVING (ADL)
ADLS_ACUITY_SCORE: 20

## 2024-06-04 NOTE — PROVIDER NOTIFICATION
06/04/24 1526   Provider Notification   Provider Name/Title Dr Flynn   Method of Notification Electronic Page   Request Evaluate - Remote   Notification Reason SVE;Status Update     MD called back. No new orders at this time.

## 2024-06-04 NOTE — PROVIDER NOTIFICATION
06/04/24 0910   Provider Notification   Provider Name/Title Dr CLAUDIA Walker   Method of Notification Electronic Page   Request Evaluate - Remote   Notification Reason Status Update;Patient Request     MD updated on Cervidil falling out. Pt requesting to take a shower before starting Pit at 10, MD okay with pt taking a shower off the monitor. Will continue to monitor.

## 2024-06-04 NOTE — PROGRESS NOTES
[facility]    Intrapartum Progress Note    S: Patient is comfortable  Reports more contractions this AM      O:    Physical Exam   Temp: 97.7  F (36.5  C) Temp src: Temporal BP: 101/55 Pulse: 68   Resp: 16 SpO2: 99 % O2 Device: None (Room air)    Vitals:    06/02/24 1645   Weight: 93.9 kg (207 lb)     Vital Signs with Ranges  Temp:  [97.1  F (36.2  C)-98.3  F (36.8  C)] 97.7  F (36.5  C)  Pulse:  [68] 68  Resp:  [16] 16  BP: (101-135)/(55-79) 101/55  SpO2:  [99 %] 99 %  No intake/output data recorded.    Exam:  General: alert, coherent    SVE @ 2200 on 6/4: dilated to 0.5, effaced 40%, and Station -4  FHR: 130, Category 1  [place]: Q 5 min    Data   Recent Labs   Lab Test 06/02/24  1806   AS Negative     Recent Labs   Lab Test 06/02/24  1806 04/03/24  1042   HGB 11.6* 12.7     Recent Labs   Lab Test 12/06/23  0812   RUQIGG No detectable antibody.       A:   -intrauterine pregnancy at 36w2d  -PPROM  -S/P 12 doses oral cytotec.  -Cervidil place at 2200    Plan:  -Continue cervical ripening.  -Start pitocin this AM when cervidil removed  -GBS negative, so Ok to stop GBS prophylaxis antibiotics  -Monitor for s/sx of IAI  -Continue to work towards delivery    Dania Walker MD on 6/4/2024 at 6:48 AM

## 2024-06-05 PROCEDURE — 250N000011 HC RX IP 250 OP 636: Performed by: OBSTETRICS & GYNECOLOGY

## 2024-06-05 PROCEDURE — 250N000011 HC RX IP 250 OP 636: Performed by: NURSE ANESTHETIST, CERTIFIED REGISTERED

## 2024-06-05 PROCEDURE — 250N000013 HC RX MED GY IP 250 OP 250 PS 637: Performed by: OBSTETRICS & GYNECOLOGY

## 2024-06-05 PROCEDURE — 3E0H7GC INTRODUCTION OF OTHER THERAPEUTIC SUBSTANCE INTO LOWER GI, VIA NATURAL OR ARTIFICIAL OPENING: ICD-10-PCS | Performed by: OBSTETRICS & GYNECOLOGY

## 2024-06-05 PROCEDURE — 258N000003 HC RX IP 258 OP 636: Performed by: OBSTETRICS & GYNECOLOGY

## 2024-06-05 PROCEDURE — 250N000009 HC RX 250: Performed by: ANESTHESIOLOGY

## 2024-06-05 PROCEDURE — 272N000001 HC OR GENERAL SUPPLY STERILE: Performed by: OBSTETRICS & GYNECOLOGY

## 2024-06-05 PROCEDURE — 258N000003 HC RX IP 258 OP 636: Performed by: NURSE ANESTHETIST, CERTIFIED REGISTERED

## 2024-06-05 PROCEDURE — 999N000016 HC STATISTIC ATTENDANCE AT DELIVERY

## 2024-06-05 PROCEDURE — 360N000076 HC SURGERY LEVEL 3, PER MIN: Performed by: OBSTETRICS & GYNECOLOGY

## 2024-06-05 PROCEDURE — 59510 CESAREAN DELIVERY: CPT | Performed by: OBSTETRICS & GYNECOLOGY

## 2024-06-05 PROCEDURE — 0W3R7ZZ CONTROL BLEEDING IN GENITOURINARY TRACT, VIA NATURAL OR ARTIFICIAL OPENING: ICD-10-PCS | Performed by: OBSTETRICS & GYNECOLOGY

## 2024-06-05 PROCEDURE — 250N000009 HC RX 250: Performed by: OBSTETRICS & GYNECOLOGY

## 2024-06-05 PROCEDURE — 120N000012 HC R&B POSTPARTUM

## 2024-06-05 PROCEDURE — 999N000157 HC STATISTIC RCP TIME EA 10 MIN

## 2024-06-05 PROCEDURE — 88307 TISSUE EXAM BY PATHOLOGIST: CPT | Mod: TC | Performed by: OBSTETRICS & GYNECOLOGY

## 2024-06-05 PROCEDURE — 710N000009 HC RECOVERY PHASE 1, LEVEL 1, PER MIN: Performed by: OBSTETRICS & GYNECOLOGY

## 2024-06-05 PROCEDURE — 250N000011 HC RX IP 250 OP 636: Performed by: ANESTHESIOLOGY

## 2024-06-05 PROCEDURE — 370N000017 HC ANESTHESIA TECHNICAL FEE, PER MIN: Performed by: OBSTETRICS & GYNECOLOGY

## 2024-06-05 RX ORDER — CEFAZOLIN SODIUM 2 G/100ML
2 INJECTION, SOLUTION INTRAVENOUS
Status: COMPLETED | OUTPATIENT
Start: 2024-06-05 | End: 2024-06-05

## 2024-06-05 RX ORDER — ACETAMINOPHEN 325 MG/1
975 TABLET ORAL EVERY 6 HOURS
Status: DISCONTINUED | OUTPATIENT
Start: 2024-06-05 | End: 2024-06-07 | Stop reason: HOSPADM

## 2024-06-05 RX ORDER — CEFAZOLIN SODIUM 2 G/100ML
2 INJECTION, SOLUTION INTRAVENOUS ONCE
Qty: 100 ML | Refills: 0 | Status: COMPLETED | OUTPATIENT
Start: 2024-06-05 | End: 2024-06-05

## 2024-06-05 RX ORDER — MISOPROSTOL 200 UG/1
800 TABLET ORAL
Status: DISCONTINUED | OUTPATIENT
Start: 2024-06-05 | End: 2024-06-05

## 2024-06-05 RX ORDER — IBUPROFEN 400 MG/1
800 TABLET, FILM COATED ORAL EVERY 6 HOURS
Status: DISCONTINUED | OUTPATIENT
Start: 2024-06-06 | End: 2024-06-07 | Stop reason: HOSPADM

## 2024-06-05 RX ORDER — MISOPROSTOL 200 UG/1
400 TABLET ORAL
Status: DISCONTINUED | OUTPATIENT
Start: 2024-06-05 | End: 2024-06-07 | Stop reason: HOSPADM

## 2024-06-05 RX ORDER — ACETAMINOPHEN 325 MG/1
975 TABLET ORAL ONCE
Status: COMPLETED | OUTPATIENT
Start: 2024-06-05 | End: 2024-06-05

## 2024-06-05 RX ORDER — NALOXONE HYDROCHLORIDE 0.4 MG/ML
0.4 INJECTION, SOLUTION INTRAMUSCULAR; INTRAVENOUS; SUBCUTANEOUS
Status: DISCONTINUED | OUTPATIENT
Start: 2024-06-05 | End: 2024-06-07 | Stop reason: HOSPADM

## 2024-06-05 RX ORDER — OXYCODONE HYDROCHLORIDE 5 MG/1
5 TABLET ORAL EVERY 4 HOURS PRN
Status: DISCONTINUED | OUTPATIENT
Start: 2024-06-05 | End: 2024-06-07 | Stop reason: HOSPADM

## 2024-06-05 RX ORDER — SODIUM CHLORIDE, SODIUM LACTATE, POTASSIUM CHLORIDE, CALCIUM CHLORIDE 600; 310; 30; 20 MG/100ML; MG/100ML; MG/100ML; MG/100ML
INJECTION, SOLUTION INTRAVENOUS CONTINUOUS
Status: DISCONTINUED | OUTPATIENT
Start: 2024-06-05 | End: 2024-06-05 | Stop reason: HOSPADM

## 2024-06-05 RX ORDER — MISOPROSTOL 200 UG/1
800 TABLET ORAL
Status: DISCONTINUED | OUTPATIENT
Start: 2024-06-05 | End: 2024-06-07 | Stop reason: HOSPADM

## 2024-06-05 RX ORDER — SIMETHICONE 80 MG
80 TABLET,CHEWABLE ORAL 4 TIMES DAILY PRN
Status: DISCONTINUED | OUTPATIENT
Start: 2024-06-05 | End: 2024-06-07 | Stop reason: HOSPADM

## 2024-06-05 RX ORDER — SODIUM CHLORIDE 9 MG/ML
INJECTION, SOLUTION INTRAVENOUS CONTINUOUS PRN
Status: DISCONTINUED | OUTPATIENT
Start: 2024-06-05 | End: 2024-06-05

## 2024-06-05 RX ORDER — HYDROMORPHONE HCL IN WATER/PF 6 MG/30 ML
PATIENT CONTROLLED ANALGESIA SYRINGE INTRAVENOUS
Status: COMPLETED
Start: 2024-06-05 | End: 2024-06-05

## 2024-06-05 RX ORDER — BISACODYL 10 MG
10 SUPPOSITORY, RECTAL RECTAL DAILY PRN
Status: DISCONTINUED | OUTPATIENT
Start: 2024-06-07 | End: 2024-06-07 | Stop reason: HOSPADM

## 2024-06-05 RX ORDER — TRANEXAMIC ACID 10 MG/ML
1 INJECTION, SOLUTION INTRAVENOUS EVERY 30 MIN PRN
Status: DISCONTINUED | OUTPATIENT
Start: 2024-06-05 | End: 2024-06-07 | Stop reason: HOSPADM

## 2024-06-05 RX ORDER — NALOXONE HYDROCHLORIDE 0.4 MG/ML
0.2 INJECTION, SOLUTION INTRAMUSCULAR; INTRAVENOUS; SUBCUTANEOUS
Status: DISCONTINUED | OUTPATIENT
Start: 2024-06-05 | End: 2024-06-07 | Stop reason: HOSPADM

## 2024-06-05 RX ORDER — OXYTOCIN 10 [USP'U]/ML
10 INJECTION, SOLUTION INTRAMUSCULAR; INTRAVENOUS
Status: DISCONTINUED | OUTPATIENT
Start: 2024-06-05 | End: 2024-06-05 | Stop reason: HOSPADM

## 2024-06-05 RX ORDER — ONDANSETRON 2 MG/ML
INJECTION INTRAMUSCULAR; INTRAVENOUS PRN
Status: DISCONTINUED | OUTPATIENT
Start: 2024-06-05 | End: 2024-06-05

## 2024-06-05 RX ORDER — LIDOCAINE 40 MG/G
CREAM TOPICAL
Status: DISCONTINUED | OUTPATIENT
Start: 2024-06-05 | End: 2024-06-05

## 2024-06-05 RX ORDER — OXYTOCIN 10 [USP'U]/ML
10 INJECTION, SOLUTION INTRAMUSCULAR; INTRAVENOUS
Status: DISCONTINUED | OUTPATIENT
Start: 2024-06-05 | End: 2024-06-05

## 2024-06-05 RX ORDER — HYDROMORPHONE HCL IN WATER/PF 6 MG/30 ML
0.2 PATIENT CONTROLLED ANALGESIA SYRINGE INTRAVENOUS
Status: DISCONTINUED | OUTPATIENT
Start: 2024-06-05 | End: 2024-06-07 | Stop reason: HOSPADM

## 2024-06-05 RX ORDER — HYDROCORTISONE 25 MG/G
CREAM TOPICAL 3 TIMES DAILY PRN
Status: DISCONTINUED | OUTPATIENT
Start: 2024-06-05 | End: 2024-06-07 | Stop reason: HOSPADM

## 2024-06-05 RX ORDER — METOCLOPRAMIDE 10 MG/1
10 TABLET ORAL EVERY 6 HOURS PRN
Status: DISCONTINUED | OUTPATIENT
Start: 2024-06-05 | End: 2024-06-07 | Stop reason: HOSPADM

## 2024-06-05 RX ORDER — HYDROMORPHONE HCL IN WATER/PF 6 MG/30 ML
PATIENT CONTROLLED ANALGESIA SYRINGE INTRAVENOUS
Status: DISCONTINUED
Start: 2024-06-05 | End: 2024-06-05 | Stop reason: HOSPADM

## 2024-06-05 RX ORDER — LIDOCAINE 40 MG/G
CREAM TOPICAL
Status: DISCONTINUED | OUTPATIENT
Start: 2024-06-05 | End: 2024-06-07 | Stop reason: HOSPADM

## 2024-06-05 RX ORDER — LOPERAMIDE HCL 2 MG
2 CAPSULE ORAL
Status: DISCONTINUED | OUTPATIENT
Start: 2024-06-05 | End: 2024-06-07 | Stop reason: HOSPADM

## 2024-06-05 RX ORDER — LOPERAMIDE HCL 2 MG
2 CAPSULE ORAL
Status: DISCONTINUED | OUTPATIENT
Start: 2024-06-05 | End: 2024-06-05

## 2024-06-05 RX ORDER — ONDANSETRON 2 MG/ML
4 INJECTION INTRAMUSCULAR; INTRAVENOUS EVERY 6 HOURS PRN
Status: DISCONTINUED | OUTPATIENT
Start: 2024-06-05 | End: 2024-06-07 | Stop reason: HOSPADM

## 2024-06-05 RX ORDER — OXYTOCIN 10 [USP'U]/ML
10 INJECTION, SOLUTION INTRAMUSCULAR; INTRAVENOUS
Status: DISCONTINUED | OUTPATIENT
Start: 2024-06-05 | End: 2024-06-07 | Stop reason: HOSPADM

## 2024-06-05 RX ORDER — DEXAMETHASONE SODIUM PHOSPHATE 4 MG/ML
INJECTION, SOLUTION INTRA-ARTICULAR; INTRALESIONAL; INTRAMUSCULAR; INTRAVENOUS; SOFT TISSUE PRN
Status: DISCONTINUED | OUTPATIENT
Start: 2024-06-05 | End: 2024-06-05

## 2024-06-05 RX ORDER — PROCHLORPERAZINE 25 MG
25 SUPPOSITORY, RECTAL RECTAL EVERY 12 HOURS PRN
Status: DISCONTINUED | OUTPATIENT
Start: 2024-06-05 | End: 2024-06-07 | Stop reason: HOSPADM

## 2024-06-05 RX ORDER — DEXTROSE, SODIUM CHLORIDE, SODIUM LACTATE, POTASSIUM CHLORIDE, AND CALCIUM CHLORIDE 5; .6; .31; .03; .02 G/100ML; G/100ML; G/100ML; G/100ML; G/100ML
INJECTION, SOLUTION INTRAVENOUS CONTINUOUS
Status: DISCONTINUED | OUTPATIENT
Start: 2024-06-05 | End: 2024-06-07 | Stop reason: HOSPADM

## 2024-06-05 RX ORDER — MORPHINE SULFATE 1 MG/ML
INJECTION, SOLUTION EPIDURAL; INTRATHECAL; INTRAVENOUS PRN
Status: DISCONTINUED | OUTPATIENT
Start: 2024-06-05 | End: 2024-06-05

## 2024-06-05 RX ORDER — CARBOPROST TROMETHAMINE 250 UG/ML
250 INJECTION, SOLUTION INTRAMUSCULAR
Status: DISCONTINUED | OUTPATIENT
Start: 2024-06-05 | End: 2024-06-07 | Stop reason: HOSPADM

## 2024-06-05 RX ORDER — PROCHLORPERAZINE MALEATE 10 MG
10 TABLET ORAL EVERY 6 HOURS PRN
Status: DISCONTINUED | OUTPATIENT
Start: 2024-06-05 | End: 2024-06-07 | Stop reason: HOSPADM

## 2024-06-05 RX ORDER — ONDANSETRON 4 MG/1
4 TABLET, ORALLY DISINTEGRATING ORAL EVERY 6 HOURS PRN
Status: DISCONTINUED | OUTPATIENT
Start: 2024-06-05 | End: 2024-06-07 | Stop reason: HOSPADM

## 2024-06-05 RX ORDER — OXYTOCIN/0.9 % SODIUM CHLORIDE 30/500 ML
340 PLASTIC BAG, INJECTION (ML) INTRAVENOUS CONTINUOUS PRN
Status: DISCONTINUED | OUTPATIENT
Start: 2024-06-05 | End: 2024-06-05 | Stop reason: HOSPADM

## 2024-06-05 RX ORDER — CARBOPROST TROMETHAMINE 250 UG/ML
INJECTION, SOLUTION INTRAMUSCULAR
Status: DISCONTINUED
Start: 2024-06-05 | End: 2024-06-05 | Stop reason: HOSPADM

## 2024-06-05 RX ORDER — CITRIC ACID/SODIUM CITRATE 334-500MG
30 SOLUTION, ORAL ORAL
Status: COMPLETED | OUTPATIENT
Start: 2024-06-05 | End: 2024-06-05

## 2024-06-05 RX ORDER — AMOXICILLIN 250 MG
1 CAPSULE ORAL 2 TIMES DAILY
Status: DISCONTINUED | OUTPATIENT
Start: 2024-06-05 | End: 2024-06-07 | Stop reason: HOSPADM

## 2024-06-05 RX ORDER — CARBOPROST TROMETHAMINE 250 UG/ML
250 INJECTION, SOLUTION INTRAMUSCULAR
Status: DISCONTINUED | OUTPATIENT
Start: 2024-06-05 | End: 2024-06-05 | Stop reason: HOSPADM

## 2024-06-05 RX ORDER — LOPERAMIDE HCL 2 MG
4 CAPSULE ORAL
Status: DISCONTINUED | OUTPATIENT
Start: 2024-06-05 | End: 2024-06-05 | Stop reason: HOSPADM

## 2024-06-05 RX ORDER — LIDOCAINE HCL/EPINEPHRINE/PF 2%-1:200K
VIAL (ML) INJECTION PRN
Status: DISCONTINUED | OUTPATIENT
Start: 2024-06-05 | End: 2024-06-05

## 2024-06-05 RX ORDER — AMOXICILLIN 250 MG
2 CAPSULE ORAL 2 TIMES DAILY
Status: DISCONTINUED | OUTPATIENT
Start: 2024-06-05 | End: 2024-06-07 | Stop reason: HOSPADM

## 2024-06-05 RX ORDER — MISOPROSTOL 200 UG/1
400 TABLET ORAL
Status: DISCONTINUED | OUTPATIENT
Start: 2024-06-05 | End: 2024-06-05

## 2024-06-05 RX ORDER — METHYLERGONOVINE MALEATE 0.2 MG/ML
200 INJECTION INTRAVENOUS
Status: DISCONTINUED | OUTPATIENT
Start: 2024-06-05 | End: 2024-06-07 | Stop reason: HOSPADM

## 2024-06-05 RX ORDER — LOPERAMIDE HCL 2 MG
4 CAPSULE ORAL
Status: DISCONTINUED | OUTPATIENT
Start: 2024-06-05 | End: 2024-06-07 | Stop reason: HOSPADM

## 2024-06-05 RX ORDER — KETOROLAC TROMETHAMINE 30 MG/ML
30 INJECTION, SOLUTION INTRAMUSCULAR; INTRAVENOUS EVERY 6 HOURS
Status: COMPLETED | OUTPATIENT
Start: 2024-06-05 | End: 2024-06-06

## 2024-06-05 RX ORDER — METHYLERGONOVINE MALEATE 0.2 MG/ML
200 INJECTION INTRAVENOUS
Status: DISCONTINUED | OUTPATIENT
Start: 2024-06-05 | End: 2024-06-05

## 2024-06-05 RX ORDER — OXYTOCIN/0.9 % SODIUM CHLORIDE 30/500 ML
100-340 PLASTIC BAG, INJECTION (ML) INTRAVENOUS CONTINUOUS PRN
Status: DISCONTINUED | OUTPATIENT
Start: 2024-06-05 | End: 2024-06-05

## 2024-06-05 RX ORDER — TRANEXAMIC ACID 10 MG/ML
1 INJECTION, SOLUTION INTRAVENOUS EVERY 30 MIN PRN
Status: DISCONTINUED | OUTPATIENT
Start: 2024-06-05 | End: 2024-06-05

## 2024-06-05 RX ORDER — OXYTOCIN/0.9 % SODIUM CHLORIDE 30/500 ML
340 PLASTIC BAG, INJECTION (ML) INTRAVENOUS CONTINUOUS PRN
Status: DISCONTINUED | OUTPATIENT
Start: 2024-06-05 | End: 2024-06-07 | Stop reason: HOSPADM

## 2024-06-05 RX ORDER — MODIFIED LANOLIN
OINTMENT (GRAM) TOPICAL
Status: DISCONTINUED | OUTPATIENT
Start: 2024-06-05 | End: 2024-06-07 | Stop reason: HOSPADM

## 2024-06-05 RX ORDER — METOCLOPRAMIDE HYDROCHLORIDE 5 MG/ML
10 INJECTION INTRAMUSCULAR; INTRAVENOUS EVERY 6 HOURS PRN
Status: DISCONTINUED | OUTPATIENT
Start: 2024-06-05 | End: 2024-06-07 | Stop reason: HOSPADM

## 2024-06-05 RX ORDER — AZITHROMYCIN 500 MG/1
500 INJECTION, POWDER, LYOPHILIZED, FOR SOLUTION INTRAVENOUS
Status: COMPLETED | OUTPATIENT
Start: 2024-06-05 | End: 2024-06-05

## 2024-06-05 RX ORDER — CEFAZOLIN SODIUM 2 G/100ML
2 INJECTION, SOLUTION INTRAVENOUS SEE ADMIN INSTRUCTIONS
Status: DISCONTINUED | OUTPATIENT
Start: 2024-06-05 | End: 2024-06-05 | Stop reason: HOSPADM

## 2024-06-05 RX ADMIN — HYDROMORPHONE HYDROCHLORIDE 0.2 MG: 0.2 INJECTION, SOLUTION INTRAMUSCULAR; INTRAVENOUS; SUBCUTANEOUS at 11:25

## 2024-06-05 RX ADMIN — CITALOPRAM HYDROBROMIDE 30 MG: 20 TABLET ORAL at 11:53

## 2024-06-05 RX ADMIN — MISOPROSTOL 800 MCG: 200 TABLET ORAL at 10:15

## 2024-06-05 RX ADMIN — Medication 340 ML/HR: at 10:06

## 2024-06-05 RX ADMIN — DEXAMETHASONE SODIUM PHOSPHATE 4 MG: 4 INJECTION, SOLUTION INTRA-ARTICULAR; INTRALESIONAL; INTRAMUSCULAR; INTRAVENOUS; SOFT TISSUE at 10:33

## 2024-06-05 RX ADMIN — AZITHROMYCIN MONOHYDRATE 500 MG: 500 INJECTION, POWDER, LYOPHILIZED, FOR SOLUTION INTRAVENOUS at 09:41

## 2024-06-05 RX ADMIN — ONDANSETRON 4 MG: 2 INJECTION INTRAMUSCULAR; INTRAVENOUS at 10:07

## 2024-06-05 RX ADMIN — SODIUM CHLORIDE: 9 INJECTION, SOLUTION INTRAVENOUS at 09:45

## 2024-06-05 RX ADMIN — SODIUM CITRATE AND CITRIC ACID MONOHYDRATE 30 ML: 500; 334 SOLUTION ORAL at 09:25

## 2024-06-05 RX ADMIN — SODIUM CHLORIDE, POTASSIUM CHLORIDE, SODIUM LACTATE AND CALCIUM CHLORIDE: 600; 310; 30; 20 INJECTION, SOLUTION INTRAVENOUS at 09:40

## 2024-06-05 RX ADMIN — CARBOPROST TROMETHAMINE 250 MCG: 250 INJECTION, SOLUTION INTRAMUSCULAR at 10:19

## 2024-06-05 RX ADMIN — AZITHROMYCIN MONOHYDRATE 500 MG: 500 INJECTION, POWDER, LYOPHILIZED, FOR SOLUTION INTRAVENOUS at 09:26

## 2024-06-05 RX ADMIN — ACETAMINOPHEN 975 MG: 325 TABLET, FILM COATED ORAL at 22:06

## 2024-06-05 RX ADMIN — SODIUM CHLORIDE, POTASSIUM CHLORIDE, SODIUM LACTATE AND CALCIUM CHLORIDE: 600; 310; 30; 20 INJECTION, SOLUTION INTRAVENOUS at 03:57

## 2024-06-05 RX ADMIN — SODIUM CHLORIDE, SODIUM LACTATE, POTASSIUM CHLORIDE, CALCIUM CHLORIDE AND DEXTROSE MONOHYDRATE: 5; 600; 310; 30; 20 INJECTION, SOLUTION INTRAVENOUS at 13:30

## 2024-06-05 RX ADMIN — CEFAZOLIN SODIUM 2 G: 2 INJECTION, SOLUTION INTRAVENOUS at 09:55

## 2024-06-05 RX ADMIN — BUPROPION HYDROCHLORIDE 300 MG: 300 TABLET, EXTENDED RELEASE ORAL at 11:53

## 2024-06-05 RX ADMIN — KETOROLAC TROMETHAMINE 30 MG: 30 INJECTION, SOLUTION INTRAMUSCULAR at 17:39

## 2024-06-05 RX ADMIN — HYDROMORPHONE HYDROCHLORIDE 0.2 MG: 0.2 INJECTION, SOLUTION INTRAMUSCULAR; INTRAVENOUS; SUBCUTANEOUS at 13:30

## 2024-06-05 RX ADMIN — SODIUM CHLORIDE, POTASSIUM CHLORIDE, SODIUM LACTATE AND CALCIUM CHLORIDE 500 ML: 600; 310; 30; 20 INJECTION, SOLUTION INTRAVENOUS at 09:26

## 2024-06-05 RX ADMIN — LIDOCAINE HYDROCHLORIDE,EPINEPHRINE BITARTRATE 15 ML: 20; .005 INJECTION, SOLUTION EPIDURAL; INFILTRATION; INTRACAUDAL; PERINEURAL at 09:40

## 2024-06-05 RX ADMIN — METHYLERGONOVINE MALEATE 200 MCG: 0.2 INJECTION INTRAVENOUS at 10:12

## 2024-06-05 RX ADMIN — TRANEXAMIC ACID 1 G: 10 INJECTION, SOLUTION INTRAVENOUS at 09:41

## 2024-06-05 RX ADMIN — LOPERAMIDE HYDROCHLORIDE 2 MG: 2 CAPSULE ORAL at 11:54

## 2024-06-05 RX ADMIN — SIMETHICONE 80 MG: 80 TABLET, CHEWABLE ORAL at 20:17

## 2024-06-05 RX ADMIN — PHENYLEPHRINE HYDROCHLORIDE 0.5 MCG/KG/MIN: 10 INJECTION INTRAVENOUS at 09:44

## 2024-06-05 RX ADMIN — OXYTOCIN 10 UNITS: 10 INJECTION, SOLUTION INTRAMUSCULAR; INTRAVENOUS at 10:20

## 2024-06-05 RX ADMIN — SENNOSIDES AND DOCUSATE SODIUM 1 TABLET: 50; 8.6 TABLET ORAL at 20:17

## 2024-06-05 RX ADMIN — CEFAZOLIN SODIUM 2 G: 2 INJECTION, SOLUTION INTRAVENOUS at 15:52

## 2024-06-05 RX ADMIN — ACETAMINOPHEN 975 MG: 325 TABLET, FILM COATED ORAL at 09:25

## 2024-06-05 RX ADMIN — ONDANSETRON 4 MG: 2 INJECTION INTRAMUSCULAR; INTRAVENOUS at 10:24

## 2024-06-05 RX ADMIN — MORPHINE SULFATE 2 MG: 1 INJECTION, SOLUTION EPIDURAL; INTRATHECAL; INTRAVENOUS at 10:39

## 2024-06-05 RX ADMIN — ACETAMINOPHEN 975 MG: 325 TABLET, FILM COATED ORAL at 15:30

## 2024-06-05 RX ADMIN — Medication: at 05:35

## 2024-06-05 ASSESSMENT — ACTIVITIES OF DAILY LIVING (ADL)
ADLS_ACUITY_SCORE: 25
ADLS_ACUITY_SCORE: 25
ADLS_ACUITY_SCORE: 20
ADLS_ACUITY_SCORE: 26
ADLS_ACUITY_SCORE: 25
ADLS_ACUITY_SCORE: 20
ADLS_ACUITY_SCORE: 20
ADLS_ACUITY_SCORE: 25
ADLS_ACUITY_SCORE: 26
ADLS_ACUITY_SCORE: 20
ADLS_ACUITY_SCORE: 20
ADLS_ACUITY_SCORE: 25
ADLS_ACUITY_SCORE: 20
ADLS_ACUITY_SCORE: 25
ADLS_ACUITY_SCORE: 20
ADLS_ACUITY_SCORE: 20
ADLS_ACUITY_SCORE: 26
ADLS_ACUITY_SCORE: 25
ADLS_ACUITY_SCORE: 25
ADLS_ACUITY_SCORE: 26
ADLS_ACUITY_SCORE: 26
ADLS_ACUITY_SCORE: 25
ADLS_ACUITY_SCORE: 25

## 2024-06-05 NOTE — PROVIDER NOTIFICATION
Dr Walker paged and returned call. SVE unchanged. Pt in agreement to proceed to csection due to minimal SVE change.

## 2024-06-05 NOTE — PROVIDER NOTIFICATION
06/04/24 8803   Provider Notification   Provider Name/Title Dr Flynn   Method of Notification Electronic Page   Request Evaluate - Remote     Update to Dr Flynn:  Kasey MARTINEZ Room 214 2/60-70/-1, bolusing for epidural. Thank you, Sherry

## 2024-06-05 NOTE — PROVIDER NOTIFICATION
06/04/24 2128   Provider Notification   Provider Name/Title Dr Flynn   Method of Notification Electronic Page   Request Evaluate - Remote   Notification Reason Uterine Activity;Pain;Status Update     Update: Kasey MARTINEZ 214 cxn q2-4 on 26mu pit. Breathing through them, will start nitrous. 2 Bp's above 140: 145/71, 147/86, Sherry BRUNO, [phone]

## 2024-06-05 NOTE — ANESTHESIA PREPROCEDURE EVALUATION
Anesthesia Pre-Procedure Evaluation    Patient: Kasey Carrera   MRN: [id number] : 1995        Procedure :           Past Medical History:   Diagnosis Date    Anxiety     Depression     Depressive disorder       Past Surgical History:   Procedure Laterality Date    ANTERIOR CRUCIATE LIGAMENT REPAIR Left     x 3    APPENDECTOMY  2012    DILATION AND CURETTAGE       and       Allergies   Allergen Reactions    Amoxicillin Hives    Fluoxetine Other (See Comments)     Impulsive, suicide though, drinking, fighting    Sertraline Other (See Comments)     Sleeping 16 hrs      Social History     Tobacco Use    Smoking status: Never    Smokeless tobacco: Never   Substance Use Topics    Alcohol use: Not Currently      Wt Readings from Last 1 Encounters:   24 93.9 kg (207 lb)        Anesthesia Evaluation            ROS/MED HX  ENT/Pulmonary:    (-) asthma   Neurologic:  - neg neurologic ROS     Cardiovascular:    (-) PIH   METS/Exercise Tolerance:     Hematologic:     (+)  no anticoagulation therapy, no coagulopathy,             Musculoskeletal:       GI/Hepatic:     (+) GERD,                   Renal/Genitourinary:       Endo:       Psychiatric/Substance Use:       Infectious Disease:       Malignancy:       Other:            Physical Exam    Airway        Mallampati: II   TM distance: > 3 FB   Neck ROM: full     Respiratory Devices and Support         Dental  no notable dental history         Cardiovascular   cardiovascular exam normal          Pulmonary   pulmonary exam normal                OUTSIDE LABS:  CBC:   Lab Results   Component Value Date    WBC 10.6 2024    WBC 8.7 2024    HGB 11.5 (L) 2024    HGB 11.6 (L) 2024    HCT 34.2 (L) 2024    HCT 34.7 (L) 2024     2024     2024     BMP:   Lab Results   Component Value Date     2024    POTASSIUM 4.3 2024    CHLORIDE 103 2024    CO2 23 2024    BUN 4.3 (L) 2024  "   CR 0.68 06/04/2024     (H) 06/04/2024     COAGS: No results found for: \"PTT\", \"INR\", \"FIBR\"  POC:   Lab Results   Component Value Date    HCG Negative 05/12/2021     HEPATIC:   Lab Results   Component Value Date    ALBUMIN 3.2 (L) 06/04/2024    PROTTOTAL 6.4 06/04/2024    ALT 12 06/04/2024    AST 11 06/04/2024    ALKPHOS 142 06/04/2024    BILITOTAL <0.2 06/04/2024     OTHER:   Lab Results   Component Value Date    MILTON 8.7 06/04/2024       Anesthesia Plan    ASA Status:  2       Anesthesia Type: Epidural.              Consents    Anesthesia Plan(s) and associated risks, benefits, and realistic alternatives discussed. Questions answered and patient/representative(s) expressed understanding.     - Discussed:     - Discussed with:  Patient            Postoperative Care            Comments:    Other Comments: Orders to manage the epidural infusion have been entered, and through coordination with the nurse, we will continute to manage and monitor the patient's labor epidural.  We will continuously be available to adjust as needed thruout the entire L&D process.            Jaya Johnson MD    I have reviewed the pertinent notes and labs in the chart from the past 30 days and (re)examined the patient.  Any updates or changes from those notes are reflected in this note.             "

## 2024-06-05 NOTE — PROVIDER NOTIFICATION
06/04/24 2010   Provider Notification   Provider Name/Title Dr Flynn   Method of Notification In Department   Request Evaluate in Person   Notification Reason Uterine Activity;Status Update      Discussed with Dr Flynn that pt at 24mu pf pitocin since 1930. Not feeling contractions or cramping. FHR cat1, afebrile. Per Dr Flynn, increase pitocin to 26mu. If pt does not start to labor within an hour of increasing pitocin to 26mu, RN to decrease pitocin by half and start increasing pitocin from that point. Pt and visitor asked for breast pump for nipple stimulation. Per Dr Flynn, pt not to use breast pump for stimulation at this time. VORB

## 2024-06-05 NOTE — PROVIDER NOTIFICATION
06/04/24 1900   Provider Notification   Provider Name/Title Dr Flynn   Method of Notification Electronic Page;Phone   Request Evaluate - Remote   Notification Reason SVE;Status Update     MD updated on unchanged SVE and Pit of 20 mu. MD would like to continue increasing Pit to 24 mu. Pt denying pain. Ambulating in room independently.

## 2024-06-05 NOTE — PROVIDER NOTIFICATION
06/05/24 1300   Provider Notification   Provider Name/Title Dr. Grace   Method of Notification Phone   Notification Reason Other (Comment)     Dr. Olson called for update. Updated that bladimir suction has been off for about 15 mins. Bleeding is scant. Uterus is firm, midline at U. Plan to take Bladimir out 15 min if stable.

## 2024-06-05 NOTE — PROGRESS NOTES
[facility]    Intrapartum Progress Note    S: Patient is comfortable with epidural  Was just checked by RN and has gone from 9.5 to 8.5 cm with thick anterior lip.      O:    Physical Exam   Temp: 97.4  F (36.3  C) Temp src: Temporal BP: 115/67     Resp: 16 SpO2: 92 % O2 Device: None (Room air)    Vitals:    24 1645   Weight: 93.9 kg (207 lb)     Vital Signs with Ranges  Temp:  [97  F (36.1  C)-98.2  F (36.8  C)] 97.4  F (36.3  C)  Resp:  [16-18] 16  BP: ()/(54-86) 115/67  SpO2:  [90 %-100 %] 92 %  I/O last 3 completed shifts:  In: -   Out: 1800 [Urine:1800]    Exam:  General: alert, coherent    SVE: dilated to 8.5, effaced 90%, and Station 0    Data   Recent Labs   Lab Test 24  1806   AS Negative     Recent Labs   Lab Test 24  2145 24  1806   HGB 11.5* 11.6*     Recent Labs   Lab Test 23  0812   RUQIGG No detectable antibody.       A:   -intrauterine pregnancy at 36w3d  -PPROM since     Plan:  -Discussed with patient lack of progress  -Option to recheck vs proceed with  due to arrest of dilation/descent discussed.  Patient would like to be rechecked in 1 hour and if no change would like to proceed with  section.    C-sections discussed with the patient.  Risks include but are not limited to bleeding, infection, emergent hysterectomy, injury to bowel and bladder and other maternal organs as well as risk of injury to the fetus. Additionally discussed blood consent at this time.  Will sign surgical and blood consent if needed      Dania Walker MD on 2024 at 8:39 AM

## 2024-06-05 NOTE — ANESTHESIA CARE TRANSFER NOTE
Patient: Kasey Carrera    Procedure: Procedure(s):   section       Diagnosis: Indication for care in labor or delivery [O75.9]  Diagnosis Additional Information: No value filed.    Anesthesia Type:   Epidural     Note:    Oropharynx: oropharynx clear of all foreign objects and spontaneously breathing  Level of Consciousness: awake  Oxygen Supplementation: room air    Independent Airway: airway patency satisfactory and stable  Dentition: dentition unchanged  Vital Signs Stable: post-procedure vital signs reviewed and stable  Report to RN Given: handoff report given  Patient transferred to: PACU    Handoff Report: Identifed the Patient, Identified the Reponsible Provider, Reviewed the pertinent medical history, Discussed the surgical course, Reviewed Intra-OP anesthesia mangement and issues during anesthesia, Set expectations for post-procedure period and Allowed opportunity for questions and acknowledgement of understanding      Vitals:  Vitals Value Taken Time   /78    Temp     Pulse 84    Resp 14    SpO2 95%        Electronically Signed By: ZAHIRA Hoover CRNA  2024  11:12 AM

## 2024-06-05 NOTE — PROGRESS NOTES
Data: Kasey Carrera transferred to 403 via cart at 1345. Baby transferred via parent's arms.  Action: Receiving unit notified of transfer: Yes. Patient and family notified of room change. Report given to MARTHA Dennis at 1345. Belongings sent to receiving unit. Accompanied by Registered Nurse. Oriented patient to surroundings. Call light within reach. ID bands double-checked with receiving RN.  Response: Patient tolerated transfer and is stable.

## 2024-06-05 NOTE — PROVIDER NOTIFICATION
06/04/24 2228   Provider Notification   Provider Name/Title Dr Flynn   Method of Notification Electronic Page   Request Evaluate - Remote     Page to Dr Flynn:  Kasey MARTINEZ  Plt 279. ALT 12, AST11. GBS results negative, would you like to continue Ancef? Thank you, Sherry [phone]

## 2024-06-05 NOTE — PROVIDER NOTIFICATION
06/04/24 0870   Provider Notification   Provider Name/Title Dr Flynn   Method of Notification Phone   Request Evaluate - Remote   Notification Reason Status Update;Other (Comment)     Per Dr Flynn, discontinue Ancef since GBS is negative. TORB

## 2024-06-05 NOTE — ANESTHESIA PROCEDURE NOTES
Epidural catheter Procedure Note    Pre-Procedure   Staff -        Anesthesiologist:  Jaya Johnson MD       Performed By: anesthesiologist       Location: OB       Pre-Anesthestic Checklist: patient identified, IV checked, site marked, risks and benefits discussed, informed consent, monitors and equipment checked, pre-op evaluation and at physician/surgeon's request  Timeout:       Correct Patient: Yes        Correct Procedure: Yes        Correct Site: Yes        Correct Position: Yes   Procedure Documentation  Procedure: epidural catheter       Patient Position: sitting       Patient Prep/Sterile Barriers: sterile gloves, mask, patient draped       Skin prep: Betadine       Local skin infiltrated with 1 mL of 1% lidocaine.        Insertion Site: L3-4. (midline approach).       Technique: LORT saline        Needle Type: Touhy needle       Needle Gauge: 17.        Needle Length (Inches): 3.5        Catheter: 19 G.          Catheter threaded easily.             # of attempts: 2 and  # of redirects:     Assessment/Narrative         Paresthesias: Yes and Resolved (brief left leg paresthesia with catheter advancement; catheter removed and 2nd attempt redirected to the right without any paresthesia).       Test dose of 3 mL lidocaine 1.5% w/ 1:200,000 epinephrine at 00:02 CDT.         Test dose negative, 3 minutes after injection, for signs of intravascular, subdural, or intrathecal injection.       Insertion/Infusion Method: LORT saline       Aspiration negative for Heme or CSF via Epidural Catheter.    Medication(s) Administered   0.125% Bupivacaine + 2 mcg/mL Fentanyl via CADD (Epidural) - EPIDURAL   10 mL - 6/5/2024 12:05:00 AM   Comments:  Pre-procedure time out completed. Patient in sitting position, the lumbar spine was prepped and draped in sterile fashion. The L3/L4 interspace was identified and local anesthetic was injected for local skin infiltration. A 17 G touhy needle was advanced to the epidural  "space which was confirmed with the loss of resistance technique at 6 cm. A catheter was then advanced easily into the epidural space. The catheter was left at 11 cm at the skin. Negative aspiration of blood and CSF was confirmed. A test dose of 1.5% lidocaine with 1:200,000 epinephrine was injected through the catheter and was negative for intravascular injection. The site was covered with sterile tegaderm and the catheter was secured with tape.       FOR [facility] ([facility]) ONLY:   Pain Team Contact information: please page the Pain Team Via [employer]. Search \"Pain\". During daytime hours, please page the attending first. At night please page the resident first.      "

## 2024-06-05 NOTE — OP NOTE
Section Procedure Note  Procedure Date:  2024    Pre-operative Diagnosis:    36w3d   Primary  section  Arrest of Descent and Dilation  PPROM  Maternal Anxiety/Depression  Rubella non-immune    Post-operative Diagnosis:   Same as pre-operative  Uterine atony  Postpartum Hemorraghe    Procedure Performed:    1.  Primary  section    Name of Surgeon: Dania Walker MD    Name of any Assistant(s):  Dr. Campbell  It was necessary to have another physician assistant due to difficult fetal extraction,uterine atony, for aide in retraction, adequate visualization and for added safety of the procedure     Type of Anesthesia Used:  Epidural    Description of Findings:    Infant's Sex: female  [place] Weight: 2.96 kg (6 lb 8.4 oz)    1 Minute 5 Minute 10 Minute   Apgar Totals: 1   9   9     General findings: Normal uterus, tubes, ovaries. Viable infant in Vertex presentation, LOT. Delivered Breech due to deeply impacted head. Nuchal cord: NO. Fluid: Clear.     Quantitative Blood Loss: 1145  Specimen(s): Placenta  Complications: none  Drains: lopez, clear urine at end of procedure  Condition:  Stable    Primary OB: Dr. Dania Walker    Consent: The risks, benefits, complications, treatment options, non-operative alternatives were discussed with the patient. Risks to include but not limited to infection, bleeding possibly requiring blood transfusion, injury to surrounding organs such as uterus/ovaries/bladder/bowel/nerves/vessels with possible need for further surgery/procedure, injury to baby, VTE/PE, and remote risk of maternal/fetal death. The patient concurred with the proposed plan, giving informed consent.   The site of the surgery is a midline structure, the uterus. Venous thrombosis prophylaxis has been ordered. Pre-operative antibiotics were ordered and given.     Procedure Details:  The patient was taken to the operating room.  Spinal anesthesia was placed.  She was then placed in dorsal  supine position with leftward tilt.  She was then prepped and draped in the usual sterile fashion.  A timeout was performed.  Anesthesia was found to be adequate.    TXA was given due to increase risk of Postpartum Hemorrhage.  A scalpel was then used to make a Pfannenstiel incision and carried down to the underlying fascia.  A transverse incision was made in the fascia, and the fascial incision was extended laterally.  Sharp and blunt dissection was then used to separate the rectus muscle from fascia superiorly and inferiorly.  The rectus muscles were then divided in the midline and the peritoneum was identified and entered bluntly.  The peritoneal incision was extended and the Enoch retractor was placed for visualization.  A low transverse uterine incision was made with a scalpel.  Upon uterine entry the incision was extended manually.  The fetal head was unable to be delivered due to deep transverse arrest. Dr. Lo was called for help at this time and I identified the feet. Feet were grasped and brought to incision, but further delivery unsuccessful.  Decision made to T uterine incision. A 2 cm upward cut was made and infant was then delivered breech and atraumatically through the incision, Umbilical cord was milked and then was clamped and cut and the infant was handed to the awaiting  nurse practitioner.    Cord blood and gases were obtained.  The placenta was then delivered by uterine massage and gentle cord traction.    The uterus was cleared of the remaining clots and debris.  The hysterotomy incision was then closed with 0-Vicryl in running locked layer followed by 0 Monocryl imbricated layer. This was also done for the T portion of the incision. Hemostasis was was noted.   Uterine atony was noted. In addition to IV pitocin, Methergine, Hemabate, Rectal Cytotec. Uterus was still felt to be atonic so Dr. Lo place Rasheeda and device activated per  protocol  Surgicel powder was  applied to hysterotomy site.   The rectus muscles and fascia were inspected and hemostasis was obtained.    The fascia was then closed in a running fashion with 0-Vicryl.    The subcutaneous tissue was irrigated and hemostasis was assured.  Subcutaneous layer was then re-approximated with 3-0 plain gut in interrupted fashion. The skin was closed with 4-0 Monocryl in subcuticular fashion.      Minimal blood noted in Rasheeda tubing at end of procedure    Instrument, sponge, and needle counts were correct prior to abdominal closure and at the conclusion of the case.      The patient is in stable condition at the end of the procedure.    Dania Walker MD  June 5, 2024  11:16 AM

## 2024-06-05 NOTE — PROVIDER NOTIFICATION
06/05/24 0119   Provider Notification   Provider Name/Title Dr Flynn, RN   Method of Notification Electronic Page   Request Evaluate - Remote   Notification Reason Status Update;BARBARA MARTINEZ Room 214 update: 3-4/80/-1, soft/mid. Comfortable with epidural, Sherry Frost RN

## 2024-06-05 NOTE — ANESTHESIA POSTPROCEDURE EVALUATION
Patient: Kasey Carrera    Procedure: Procedure(s):   section       Anesthesia Type:  Epidural    Note:  Disposition: Inpatient   Postop Pain Control: Uneventful            Sign Out: Well controlled pain   PONV: No   Neuro/Psych: Uneventful            Sign Out: Acceptable/Baseline neuro status (Recovery from neuraxial anesthesia has not occurred but is anticipated within 12 hours. )   Airway/Respiratory: Uneventful            Sign Out: Acceptable/Baseline resp. status   CV/Hemodynamics: Uneventful            Sign Out: Acceptable CV status   Other NRE: NONE   DID A NON-ROUTINE EVENT OCCUR? No           Last vitals:  Vitals Value Taken Time   /75 24 1315   Temp 36.6  C (97.8  F) 24 1115   Pulse 84 24 1329   Resp 35 24 1329   SpO2 100 % 24 1329   Vitals shown include unfiled device data.    Electronically Signed By: Moe Holder MD  2024  1:50 PM

## 2024-06-05 NOTE — PROVIDER NOTIFICATION
Dr Walker paged regarding SVE 8-9/90/0. MD at bedside to assess and discuss continued labor vs csection for arrest of dilation. Pt would like to reassess SVE one hour after previous check. If SVE unchanged, pt would elect csection. Contractions are difficult to trace on monitor. RN at bedside palpating.

## 2024-06-05 NOTE — PROGRESS NOTES
S: I was notified by labor RN of persistent category II fetal heart rate tracing for 14 minutes.  Strip reviewed at bedside.  I came to the bedside to review with the RN.     O: Baseline rate now is 120bpm, normal  Variability moderate  Accelerations not present  Decelerations not present  Previously: prolonged decel to tee of 70bpm     Assessment: EFM interpretation suggests absence of concern for fetal metabolic acidemia at this time due to decelerations absent, heart rate: normal baseline, and variability: moderate    Uterine Activity irregular.     The interventions currently taken to improve the fetal heart rate tracing and fetal oxygenation are: maternal positioning, IV fluid bolus , discontinue oxytocin , increase frequency of assessment, consult Category 2 algorithm, evaluate labor progress, sterile vaginal exam, and emotional support    A: Persistent category II tracing, now resolved.    P: Per the category II algorithm, the plan is to continue observe/conservative management. Reevaluate in 30 minutes for initiation of pitocin.     Arianne Flynn MD, S  06/05/24

## 2024-06-06 LAB
HGB BLD-MCNC: 9.2 G/DL (ref 11.7–15.7)
PATH REPORT.COMMENTS IMP SPEC: NORMAL
PATH REPORT.COMMENTS IMP SPEC: NORMAL
PATH REPORT.FINAL DX SPEC: NORMAL
PATH REPORT.GROSS SPEC: NORMAL
PATH REPORT.MICROSCOPIC SPEC OTHER STN: NORMAL
PATH REPORT.RELEVANT HX SPEC: NORMAL
PHOTO IMAGE: NORMAL

## 2024-06-06 PROCEDURE — 120N000012 HC R&B POSTPARTUM

## 2024-06-06 PROCEDURE — 250N000013 HC RX MED GY IP 250 OP 250 PS 637: Performed by: OBSTETRICS & GYNECOLOGY

## 2024-06-06 PROCEDURE — 88307 TISSUE EXAM BY PATHOLOGIST: CPT | Mod: 26 | Performed by: PATHOLOGY

## 2024-06-06 PROCEDURE — 36415 COLL VENOUS BLD VENIPUNCTURE: CPT | Performed by: OBSTETRICS & GYNECOLOGY

## 2024-06-06 PROCEDURE — 85018 HEMOGLOBIN: CPT | Performed by: OBSTETRICS & GYNECOLOGY

## 2024-06-06 PROCEDURE — 90471 IMMUNIZATION ADMIN: CPT | Performed by: OBSTETRICS & GYNECOLOGY

## 2024-06-06 PROCEDURE — 250N000011 HC RX IP 250 OP 636: Performed by: OBSTETRICS & GYNECOLOGY

## 2024-06-06 PROCEDURE — 90707 MMR VACCINE SC: CPT | Performed by: OBSTETRICS & GYNECOLOGY

## 2024-06-06 PROCEDURE — 250N000011 HC RX IP 250 OP 636: Performed by: ANESTHESIOLOGY

## 2024-06-06 RX ADMIN — ACETAMINOPHEN 975 MG: 325 TABLET, FILM COATED ORAL at 16:00

## 2024-06-06 RX ADMIN — BUPROPION HYDROCHLORIDE 300 MG: 300 TABLET, EXTENDED RELEASE ORAL at 10:15

## 2024-06-06 RX ADMIN — KETOROLAC TROMETHAMINE 30 MG: 30 INJECTION, SOLUTION INTRAMUSCULAR at 06:55

## 2024-06-06 RX ADMIN — NALBUPHINE HYDROCHLORIDE 5 MG: 20 INJECTION, SOLUTION INTRAMUSCULAR; INTRAVENOUS; SUBCUTANEOUS at 01:16

## 2024-06-06 RX ADMIN — ACETAMINOPHEN 975 MG: 325 TABLET, FILM COATED ORAL at 22:41

## 2024-06-06 RX ADMIN — ACETAMINOPHEN 975 MG: 325 TABLET, FILM COATED ORAL at 10:15

## 2024-06-06 RX ADMIN — CITALOPRAM HYDROBROMIDE 30 MG: 20 TABLET ORAL at 10:15

## 2024-06-06 RX ADMIN — KETOROLAC TROMETHAMINE 30 MG: 30 INJECTION, SOLUTION INTRAMUSCULAR at 01:13

## 2024-06-06 RX ADMIN — SENNOSIDES AND DOCUSATE SODIUM 2 TABLET: 50; 8.6 TABLET ORAL at 19:44

## 2024-06-06 RX ADMIN — IBUPROFEN 800 MG: 400 TABLET ORAL at 13:33

## 2024-06-06 RX ADMIN — ACETAMINOPHEN 975 MG: 325 TABLET, FILM COATED ORAL at 04:26

## 2024-06-06 RX ADMIN — MEASLES, MUMPS, AND RUBELLA VIRUS VACCINE LIVE 0.5 ML: 1000; 12500; 1000 INJECTION, POWDER, LYOPHILIZED, FOR SUSPENSION SUBCUTANEOUS at 10:17

## 2024-06-06 RX ADMIN — SENNOSIDES AND DOCUSATE SODIUM 1 TABLET: 50; 8.6 TABLET ORAL at 10:14

## 2024-06-06 RX ADMIN — IBUPROFEN 800 MG: 400 TABLET ORAL at 19:43

## 2024-06-06 ASSESSMENT — ACTIVITIES OF DAILY LIVING (ADL)
ADLS_ACUITY_SCORE: 24
ADLS_ACUITY_SCORE: 20
ADLS_ACUITY_SCORE: 24
ADLS_ACUITY_SCORE: 25
ADLS_ACUITY_SCORE: 24

## 2024-06-06 NOTE — PROGRESS NOTES
[facility]    Obstetrics Post-Op / Progress Note    Assessment & Plan   Assessment:  -1 Day Post-Op  Procedure(s):   section    Doing well.  Clean wound without signs of infection.  No immediate surgical complications identified.  No excessive bleeding  Pain well-controlled.      Plan:  Ambulation encouraged  Breast feeding strategies discussed  Monitor wound for signs of infection  Pain control measures as needed  Reportable signs and symptoms dicussed with the patient  Am hemoglobin pending  Anticipate discharge in 2 days    Dania Walker MD     Interval History   Doing well.  Pain is well-controlled.  No fevers.  No history of wound drainage, warmth or significant erythema.  Good appetite.  Denies chest pain, shortness of breath, nausea or vomiting.  Ambulatory.  breastfeeding with minimal assistance.  Flatus present. Voiding without difficulty.     Medications   Current Facility-Administered Medications   Medication Dose Route Frequency Provider Last Rate Last Admin    dextrose 5% in lactated ringers infusion   Intravenous Continuous Dania Walker  mL/hr at 24 1330 New Bag at 24 1330    No Tdap Needed - Assessment: Patient does not need Tdap vaccine   Does not apply Continuous PRN Dania Walker MD        oxytocin (PITOCIN) 30 units in 500 mL 0.9% NaCl infusion  340 mL/hr Intravenous Continuous PRN Dania Walker MD        oxytocin (PITOCIN) 30 units in 500 mL 0.9% NaCl infusion  100-340 mL/hr Intravenous Continuous PRN Hellen Long MD         Current Facility-Administered Medications   Medication Dose Route Frequency Provider Last Rate Last Admin    acetaminophen (TYLENOL) tablet 975 mg  975 mg Oral Q6H Dania Walker MD   975 mg at 24 1015    buPROPion (WELLBUTRIN XL) 24 hr tablet 300 mg  300 mg Oral QAM Hellen Long MD   300 mg at 24 1015    citalopram (celeXA) tablet 30 mg  30 mg Oral Daily Hellen Long MD   30 mg at 24  1015    ibuprofen (ADVIL/MOTRIN) tablet 800 mg  800 mg Oral Q6H Dania Walker MD        senna-docusate (SENOKOT-S/PERICOLACE) 8.6-50 MG per tablet 1 tablet  1 tablet Oral BID Dania Walker MD   1 tablet at 06/06/24 1014    Or    senna-docusate (SENOKOT-S/PERICOLACE) 8.6-50 MG per tablet 2 tablet  2 tablet Oral BID Dania Walker MD        sodium chloride (PF) 0.9% PF flush 3 mL  3 mL Intracatheter Q8H Dania Walker MD   3 mL at 06/06/24 0118       Physical Exam   Temp: 97.8  F (36.6  C) Temp src: Oral BP: 99/56 Pulse: 69   Resp: 16 SpO2: 98 % O2 Device: None (Room air)    Vitals:    06/02/24 1645   Weight: 93.9 kg (207 lb)     Vital Signs with Ranges  Temp:  [97.8  F (36.6  C)-98.8  F (37.1  C)] 97.8  F (36.6  C)  Pulse:  [69-91] 69  Resp:  [11-20] 16  BP: ()/(42-84) 99/56  SpO2:  [93 %-98 %] 98 %  I/O last 3 completed shifts:  In: 2707.14 [P.O.:1000; I.V.:1707.14]  Out: 5290 [Urine:3750; Blood:1540]    Uterine fundus is firm, non-tender and at the level of the umbilicus  Incision C/D/I  Extremities Non-tender  Heart is regular rate and rhythm and lungs clear to auscultation    Data   Recent Labs   Lab Test 06/02/24  1806   AS Negative     Recent Labs   Lab Test 06/04/24  2145 06/02/24  1806   HGB 11.5* 11.6*     Recent Labs   Lab Test 12/06/23  0812   RUQIGG No detectable antibody.

## 2024-06-07 VITALS
SYSTOLIC BLOOD PRESSURE: 126 MMHG | TEMPERATURE: 98.1 F | HEIGHT: 68 IN | WEIGHT: 208.56 LBS | DIASTOLIC BLOOD PRESSURE: 75 MMHG | BODY MASS INDEX: 31.61 KG/M2 | RESPIRATION RATE: 18 BRPM | HEART RATE: 83 BPM | OXYGEN SATURATION: 98 %

## 2024-06-07 PROBLEM — O46.90 VAGINAL BLEEDING IN PREGNANCY: Status: RESOLVED | Noted: 2024-06-02 | Resolved: 2024-06-07

## 2024-06-07 PROBLEM — D62 ABLA (ACUTE BLOOD LOSS ANEMIA): Status: ACTIVE | Noted: 2024-06-07

## 2024-06-07 PROBLEM — O42.919 PRETERM PREMATURE RUPTURE OF MEMBRANES (PPROM) DELIVERED, CURRENT HOSPITALIZATION: Status: ACTIVE | Noted: 2024-06-07

## 2024-06-07 PROBLEM — Z36.89 ENCOUNTER FOR TRIAGE IN PREGNANT PATIENT: Status: RESOLVED | Noted: 2024-06-02 | Resolved: 2024-06-07

## 2024-06-07 PROBLEM — Z98.891 S/P PRIMARY LOW TRANSVERSE C-SECTION: Status: ACTIVE | Noted: 2024-06-07

## 2024-06-07 PROCEDURE — 250N000013 HC RX MED GY IP 250 OP 250 PS 637: Performed by: OBSTETRICS & GYNECOLOGY

## 2024-06-07 RX ORDER — ACETAMINOPHEN 325 MG/1
975 TABLET ORAL EVERY 6 HOURS
Qty: 40 TABLET | Refills: 1 | Status: SHIPPED | OUTPATIENT
Start: 2024-06-07

## 2024-06-07 RX ORDER — IBUPROFEN 800 MG/1
800 TABLET, FILM COATED ORAL EVERY 6 HOURS
Qty: 30 TABLET | Refills: 1 | Status: SHIPPED | OUTPATIENT
Start: 2024-06-07

## 2024-06-07 RX ORDER — BUPROPION HYDROCHLORIDE 300 MG/1
300 TABLET ORAL EVERY MORNING
Qty: 30 TABLET | Refills: 1 | Status: SHIPPED | OUTPATIENT
Start: 2024-06-07

## 2024-06-07 RX ORDER — OXYCODONE HYDROCHLORIDE 5 MG/1
5 TABLET ORAL EVERY 4 HOURS PRN
Qty: 10 TABLET | Refills: 0 | Status: SHIPPED | OUTPATIENT
Start: 2024-06-07

## 2024-06-07 RX ADMIN — SENNOSIDES AND DOCUSATE SODIUM 1 TABLET: 50; 8.6 TABLET ORAL at 08:40

## 2024-06-07 RX ADMIN — IBUPROFEN 800 MG: 400 TABLET ORAL at 01:40

## 2024-06-07 RX ADMIN — CITALOPRAM HYDROBROMIDE 30 MG: 20 TABLET ORAL at 08:40

## 2024-06-07 RX ADMIN — ACETAMINOPHEN 975 MG: 325 TABLET, FILM COATED ORAL at 04:24

## 2024-06-07 RX ADMIN — IBUPROFEN 800 MG: 400 TABLET ORAL at 08:40

## 2024-06-07 RX ADMIN — ACETAMINOPHEN 975 MG: 325 TABLET, FILM COATED ORAL at 10:21

## 2024-06-07 RX ADMIN — BUPROPION HYDROCHLORIDE 300 MG: 300 TABLET, EXTENDED RELEASE ORAL at 08:40

## 2024-06-07 ASSESSMENT — ACTIVITIES OF DAILY LIVING (ADL)
ADLS_ACUITY_SCORE: 20

## 2024-06-07 NOTE — LACTATION NOTE
"Lactation visit with Kasey, FOSUJATA, and baby girl.    Infant LPT, Kasey has been breastfeeding infant , pumping while dad bottle feeds infant supplementation (EBM and formula). Kasey had one of our lactation consultants as her Primary RN and Kasey states she is feeling very comfortable with latching infant. Encouraged lactation follow-up to progress their feeding plan.      Recommended infant is offered both breasts with every feeding session.   Discussed physiology of milk production from colostrum through milk \"coming in\" between day 3-5 (typically); emphasizing adequate stimulation to the breast is what causes this change to occur. Provided pumping log with expected milk volumes through day 14 post partum. Answered questions regarding \"how to know when infant is done at the breast\". Discussed listening for infant to have audible swallows along with watching for changes in infant's stool color. Discussed normal infant weight loss and when infant should be back to birth weight. Stressed the importance of continuing to track infant's feeds and void/stools patterns, at least until infant has returned to his birth weight.    Answered general pumping questions, finding correct flange size, etc. Kasey has a new breast pump for home use.     Recommended to utilize our \"Guide to Postpartum and [place] Care\" handbook as great resource for discharge.     Feeding plan recommendations: provide unlimited, on-demand breast feedings: At least 8-12 times/24 hours (reviewed early feeding cues). Suggested pumping as long as supplementation is necessary. Encouraged on-going use of a feeding log or onur to record feedings along with void/stool patterns. Avoid pacifiers (until 1 month of age per AAP guidelines) and supplementation with formula unless medically indicated.     Follow up with Pediatrician as requested and encouraged lactation follow up. Reviewed [facility] outpatient lactation resources. Appreciative of visit.    Hyacinth Duong RN, " IBCLC

## 2024-06-07 NOTE — DISCHARGE INSTRUCTIONS
"Postpartum Care at Home With Your Baby: Care Instructions  Overview     After childbirth (postpartum period), your body goes through many changes as you recover. In these weeks after delivery, try to take good care of yourself. Get rest whenever you can and accept help from others.  It may take 4 to 6 weeks to feel like yourself again, and possibly longer if you had a  birth. You may feel sore or very tired as you recover. After delivery, you may continue to have contractions as the uterus returns to the size it was before your pregnancy. You will also have some vaginal bleeding. And you may have pain around the vagina as you heal. Several days after delivery you may also have pain and swelling in your breasts as they fill with milk. There are things you can do at home to help ease these discomforts.  After childbirth, it's common to feel emotional. You may feel irritable, cry easily, and feel happy one minute and sad the next. This is called the \"baby blues.\" Hormone changes are one cause of these emotional changes. These feelings usually get better within a couple of weeks. If they don't, talk to your doctor or midwife.  In the first couple of weeks after you give birth, your doctor or midwife may want to check in with you and make a plan for follow-up care. You will likely have a complete postpartum visit in the first 3 months after delivery. At that time, your doctor or midwife will check on your recovery and see how you're doing. But if you have questions or concerns before then, you can always call your doctor or midwife.  Follow-up care is a key part of your treatment and safety. Be sure to make and go to all appointments, and call your doctor if you are having problems. It's also a good idea to know your test results and keep a list of the medicines you take.  How can you care for yourself at home?  Taking care of your body  Use pads instead of tampons for bleeding. After birth, you will have bloody " vaginal discharge. You may also pass some blood clots that shouldn't be bigger than an egg. Over the next 6 weeks or so, your bleeding should decrease a little every day and slowly change to a pinkish and then whitish discharge.  For cramps or mild pain, try an over-the-counter pain medicine, such as acetaminophen (Tylenol) or ibuprofen (Advil, Motrin). Read and follow all instructions on the label.  To ease pain around the vagina or from hemorrhoids:  Put ice or a cold pack on the area for 10 to 20 minutes at a time. Put a thin cloth between the ice and your skin.  Try sitting in a few inches of warm water (sitz bath) when you can or after bowel movements.  Clean yourself with a gentle squeeze of warm water from a bottle instead of wiping with toilet paper.  Use witch hazel or hemorrhoid pads (such as Tucks).  Try using a cold compress for sore and swollen breasts. And wear a supportive bra that fits.  Ease constipation by drinking plenty of fluids and eating high-fiber foods. Ask your doctor or midwife about over-the-counter stool softeners.  Activity  Rest when you can.  Ask for help from family or friends when you need it.  If you can, have another adult in your home for at least 2 or 3 days after birth.  When you feel ready, try to get some exercise every day. For many people, walking is a good choice. Don't do any heavy exercise until your doctor or midwife says it's okay.  Ask your doctor or midwife when it is okay to have vaginal sex.  If you don't want to get pregnant, talk to your doctor or midwife about birth control options. You can get pregnant even before your period returns. Also, you can get pregnant while you are breastfeeding.  Talk to your doctor or midwife if you want to get pregnant again. They can talk to you about when it is safe.  Emotional health  It's normal to have some sadness, anxiety, and mood swings after delivery. It may help to talk with a trusted friend or family member. You can  also call the Maternal Mental Health Hotline at 9-695-YWZ-MAMA (1-286.128.5307) for support. If these mood changes last more than a couple of weeks, talk to your doctor or midwife.  When should you call for help?  Share this information with your partner, family, or a friend. They can help you watch for warning signs.  Call 911  anytime you think you may need emergency care. For example, call if:    You feel you cannot stop from hurting yourself, your baby, or someone else.     You passed out (lost consciousness).     You have chest pain, are short of breath, or cough up blood.     You have a seizure.   Where to get help 24 hours a day, 7 days a week   If you or someone you know talks about suicide, self-harm, a mental health crisis, a substance use crisis, or any other kind of emotional distress, get help right away. You can:    Call the Suicide and Crisis Lifeline at 988.     Call 9-692-932-TALK (1-431.962.8114).     Text HOME to 159202 to access the Crisis Text Line.   Consider saving these numbers in your phone.  Go to [employer] for more information or to chat online.  Call your doctor or midwife now or seek immediate medical care if:    You have signs of hemorrhage (too much bleeding), such as:  Heavy vaginal bleeding. This means that you are soaking through one or more pads in an hour. Or you pass blood clots bigger than an egg.  Feeling dizzy or lightheaded, or you feel like you may faint.  Feeling so tired or weak that you cannot do your usual activities.  A fast or irregular heartbeat.  New or worse belly pain.     You have signs of infection, such as:  A fever.  Increased pain, swelling, warmth, or redness from an incision or wound.  Frequent or painful urination or blood in your urine.  Vaginal discharge that smells bad.  New or worse belly pain.     You have symptoms of a blood clot in your leg (called a deep vein thrombosis), such as:  Pain in the calf, back of the knee, thigh, or groin.  Swelling  "in the leg or groin.  A color change on the leg or groin. The skin may be reddish or purplish, depending on your usual skin color.     You have signs of preeclampsia, such as:  Sudden swelling of your face, hands, or feet.  New vision problems (such as dimness, blurring, or seeing spots).  A severe headache.     You have signs of heart failure, such as:  New or increased shortness of breath.  New or worse swelling in your legs, ankles, or feet.  Sudden weight gain, such as more than 2 to 3 pounds in a day or 5 pounds in a week.  Feeling so tired or weak that you cannot do your usual activities.     You had spinal or epidural pain relief and have:  New or worse back pain.  Increased pain, swelling, warmth, or redness at the injection site.  Tingling, weakness, or numbness in your legs or groin.   Watch closely for changes in your health, and be sure to contact your doctor or midwife if:    Your vaginal bleeding isn't decreasing.     You feel sad, anxious, or hopeless for more than a few days.     You are having problems with your breasts or breastfeeding.   Where can you learn more?  Go to https://www.[employer].net/patiented  Enter Z768 in the search box to learn more about \"Postpartum Care at Home With Your Baby: Care Instructions.\"  Current as of: July 10, 2023               Content Version: 14.0    2628-7325 [employer].   Care instructions adapted under license by your healthcare professional. If you have questions about a medical condition or this instruction, always ask your healthcare professional. [employer] disclaims any warranty or liability for your use of this information.         Section: What to Expect at Home  Your Recovery     A  section, or , is surgery to deliver your baby through a cut that the doctor makes in your lower belly and uterus. The cut is called an incision.  You may have some pain in your lower belly and need pain medicine for 1 to 2 " weeks. You can expect some vaginal bleeding for several weeks. You will probably need about 6 weeks to fully recover.  It's important to take it easy while the incision heals. Avoid heavy lifting, strenuous activities, and exercises that strain the belly muscles while you recover. Ask a family member or friend for help with housework, cooking, and shopping.  This care sheet gives you a general idea about how long it will take for you to recover. But each person recovers at a different pace. Follow the steps below to get better as quickly as possible.  How can you care for yourself at home?  Activity    Rest when you feel tired. Getting enough sleep will help you recover.     Try to walk each day. Start by walking a little more than you did the day before. Bit by bit, increase the amount you walk. Walking boosts blood flow and helps prevent pneumonia, constipation, and blood clots.     Avoid strenuous activities, such as bicycle riding, jogging, weightlifting, and aerobic exercise, for 6 weeks or until your doctor says it is okay.     Until your doctor says it is okay, do not lift anything heavier than your baby.     Do not do sit-ups or other exercises that strain the belly muscles for 6 weeks or until your doctor says it is okay.     Hold a pillow over your incision when you cough or take deep breaths. This will support your belly and decrease your pain.     You may shower as usual. Pat the incision dry when you are done.     You will have some vaginal bleeding. Wear sanitary pads. Do not douche or use tampons until your doctor says it is okay.     Ask your doctor when you can drive again.     You will probably need to take at least 6 weeks off work. It depends on the type of work you do and how you feel.     Ask your doctor when it is okay for you to have sex.   Diet    You can eat your normal diet. If your stomach is upset, try bland, low-fat foods like plain rice, broiled chicken, toast, and yogurt.     Drink  plenty of fluids (unless your doctor tells you not to).     You may notice that your bowel movements are not regular right after your surgery. This is common. Try to avoid constipation and straining with bowel movements. You may want to take a fiber supplement every day. If you have not had a bowel movement after a couple of days, ask your doctor about taking a mild laxative.     If you are breastfeeding, limit alcohol. Alcohol can cause a lack of energy and other health problems for the baby when a breastfeeding woman drinks heavily. It can also get in the way of a mom's ability to feed her baby or to care for the child in other ways. There isn't a lot of research about exactly how much alcohol can harm a baby. Having no alcohol is the safest choice for your baby. If you choose to have a drink now and then, have only one drink, and limit the number of occasions that you have a drink. Wait to breastfeed at least 2 hours after you have a drink to reduce the amount of alcohol the baby may get in the milk.   Medicines    Your doctor will tell you if and when you can restart your medicines. You will also get instructions about taking any new medicines.     If you stopped taking aspirin or some other blood thinner, your doctor will tell you when to start taking it again.     Take pain medicines exactly as directed.  If the doctor gave you a prescription medicine for pain, take it as prescribed.  If you are not taking a prescription pain medicine, ask your doctor if you can take an over-the-counter medicine.     If you think your pain medicine is making you sick to your stomach:  Take your medicine after meals (unless your doctor has told you not to).  Ask your doctor for a different pain medicine.     If your doctor prescribed antibiotics, take them as directed. Do not stop taking them just because you feel better. You need to take the full course of antibiotics.   Incision care    If you have strips of tape on the  incision, leave the tape on for a week or until it falls off.     Wash the area daily with warm, soapy water, and pat it dry. Don't use hydrogen peroxide or alcohol, which can slow healing. You may cover the area with a gauze bandage if it weeps or rubs against clothing. Change the bandage every day.     Keep the area clean and dry.   Other instructions    If you breastfeed your baby, you may be more comfortable while you are healing if you don't rest your baby on your belly. Try tucking your baby under your arm, with your baby's body along the side you will be feeding on. Support your baby's upper body with your arm. With that hand you can control your baby's head to bring your baby's mouth to your breast. This is sometimes called the Redgage hold.   Follow-up care is a key part of your treatment and safety. Be sure to make and go to all appointments, and call your doctor if you are having problems. It's also a good idea to know your test results and keep a list of the medicines you take.  When should you call for help?  Share this information with your partner, family, or a friend. They can help you watch for warning signs.  Call 911  anytime you think you may need emergency care. For example, call if:    You feel you cannot stop from hurting yourself, your baby, or someone else.     You passed out (lost consciousness).     You have chest pain, are short of breath, or cough up blood.     You have a seizure.   Where to get help 24 hours a day, 7 days a week   If you or someone you know talks about suicide, self-harm, a mental health crisis, a substance use crisis, or any other kind of emotional distress, get help right away. You can:    Call the Suicide and Crisis Lifeline at 758.     Call 1-493-425-TALK (1-553.199.3232).     Text HOME to 905471 to access the Crisis Text Line.   Consider saving these numbers in your phone.  Go to [employer] for more information or to chat online.  Call your doctor or midwife  now or seek immediate medical care if:    You have loose stitches, or your incision comes open.     You have signs of hemorrhage (too much bleeding), such as:  Heavy vaginal bleeding. This means that you are soaking through one or more pads in an hour. Or you pass blood clots bigger than an egg.  Feeling dizzy or lightheaded, or you feel like you may faint.  Feeling so tired or weak that you cannot do your usual activities.  A fast or irregular heartbeat.  New or worse belly pain.     You have symptoms of infection, such as:  Increased pain, swelling, warmth, or redness.  Red streaks leading from the incision.  Pus draining from the incision.  A fever.  Frequent or painful urination or blood in your urine.  Vaginal discharge that smells bad.  New or worse belly pain.     You have symptoms of a blood clot in your leg (called a deep vein thrombosis), such as:  Pain in the calf, back of the knee, thigh, or groin.  Swelling in the leg or groin.  A color change on the leg or groin. The skin may be reddish or purplish, depending on your usual skin color.     You have signs of preeclampsia, such as:  Sudden swelling of your face, hands, or feet.  New vision problems (such as dimness, blurring, or seeing spots).  A severe headache.     You have signs of heart failure, such as:  New or increased shortness of breath.  New or worse swelling in your legs, ankles, or feet.  Sudden weight gain, such as more than 2 to 3 pounds in a day or 5 pounds in a week.  Feeling so tired or weak that you cannot do your usual activities.     You had spinal or epidural pain relief and have:  New or worse back pain.  Increased pain, swelling, warmth, or redness at the injection site.  Tingling, weakness, or numbness in your legs or groin.   Watch closely for changes in your health, and be sure to contact your doctor or midwife if:    Your vaginal bleeding isn't decreasing.     You feel sad, anxious, or hopeless for more than a few days.     You  "are having problems with your breasts or breastfeeding.   Where can you learn more?  Go to https://www.[employer].net/patiented  Enter M806 in the search box to learn more about \" Section: What to Expect at Home.\"  Current as of: July 10, 2023               Content Version: 14.0    7659-1065 [employer].   Care instructions adapted under license by your healthcare professional. If you have questions about a medical condition or this instruction, always ask your healthcare professional. [employer] disclaims any warranty or liability for your use of this information.        Warning Signs after Having a Baby    Keep this paper on your fridge or somewhere else where you can see it.    Call your provider if you have any of these symptoms up to 12 weeks after having your baby.    Thoughts of hurting yourself or your baby  Pain in your chest or trouble breathing  Severe headache not helped by pain medicine  Eyesight concerns (blurry vision, seeing spots or flashes of light, other changes to eyesight)  Fainting, shaking or other signs of a seizure    Call  if you feel that it is an emergency.     The symptoms below can happen to anyone after giving birth. They can be very serious. Call your provider if you have any of these warning signs.    My provider s phone number: _______________________    Losing too much blood (hemorrhage)    Call your provider if you soak through a pad in less than an hour or pass blood clots bigger than a golf ball. These may be signs that you are bleeding too much.    Blood clots in the legs or lungs    After you give birth, your body naturally clots its blood to help prevent blood loss. Sometimes this increased clotting can happen in other areas of the body, like the legs or lungs. This can block your blood flow and be very dangerous.     Call your provider if you:  Have a red, swollen spot on the back of your leg that is warm or painful when you touch it. "   Are coughing up blood.     Infection    Call your provider if you have any of these symptoms:  Fever of 100.4 F (38 C) or higher.  Pain or redness around your stitches if you had an incision.   Any yellow, white, or green fluid coming from places where you had stitches or surgery.    Mood Problems (postpartum depression)    Many people feel sad or have mood changes after having a baby. But for some people, these mood swings are worse.     Call your provider right away if you feel so anxious or nervous that you can't care for yourself or your baby.    Preeclampsia (high blood pressure)    Even if you didn't have high blood pressure when you were pregnant, you are at risk for the high blood pressure disease called preeclampsia. This risk can last up to 12 weeks after giving birth.     Call your provider if you have:   Pain on your right side under your rib cage  Sudden swelling in the hands and face    Remember: You know your body. If something doesn't feel right, get medical help.     For informational purposes only. Not to replace the advice of your health care provider. Copyright 2020 [facility]. All rights reserved. Clinically reviewed by Tamiko Ordaz, RNC-OB, MSN. [employer] [id number] - Rev 02/23.

## 2024-06-07 NOTE — PLAN OF CARE
"Goal Outcome Evaluation:  Writer assumed  care at 1515.  Pt remains comfortable with mild ctx.  Oral cytotec given x11.  Pt requested to have SVE.  Discussed with patient at this time there is not really a necessity for SVE as patient is not uncomfortable with ctx.  Pt verbalized understanding and asked that RN check cervix.  Minimal cervical change noted.  Category 1 tracing.  Pt repositioning frequently and walking around room.  Pt and family took EFM off when RN not in room. RN to room, pt states she wants a \"break\" from being on the monitor.  Pt and family educated on necessity of EFM on during cervical ripening.  Pt and family verbalized understanding.  EFM replaced.  Pt leaking scant fluid, no bleeding noted at this time.   and family present and supportive at bedside.  Will continue to monitor and assess.                        "
"Phone call from lab, unable to run ROM+ as sample was bloody.  RN attempted to collect 2nd ROM+.  Pt screaming during collection process.  RN stopped collection.  Pt stated she felt \"very sensitive and felt like when she had her hysteroscopy.\"  Pt continued to cry and appeared visually upset. RN offered emotional support to patient and reassured patient that NST was reassuring at this time.  RN unable to collect ROM+ at this time.  Dr Long notified unable to send ROM+.  Pt denies feeling ctx pain at this time.  + accels noted.   present and supportive at bedside.  Will continue to monitor and assess.                        "
"Pt arrived to maternal assessment center by ambulance.  EFM placed with paitient's verbal consent.  Pt states she went to the bathroom at 1540 and noted a large amount of blood in the toilet.  Pt states she called nurse line and 911.  Vss, small amount of blood on pad.  Pt denies feeling ctx or leaking of fluid.   present and supportive.  Pt states she noticed her abdomen feels less \"hard.\"  Will continue to monitor and assess.                        "
1050 - Report received.  All cares assumed.    
1925 Report rcvd from Karli Gonzalez RN.  Will assume cares at this time.  1930 Pt sleeping in bed with .  2017 No response from Dr. Walker.  Dr. Rodriguez pgd for orders.  2021 Call back from Dr. Rodriguez.  Orders rcvd.  [id number] Some loss of capture of FHTs.  RN at [facility].  Baby very active.  2210 Pt states she is only feeling an occasional cramp.  Up on ball after Cervidil placement.  0700 Pt slept all night.  Waking easily for interventions but easily falling back asleep.  0715 Report given to Marta Fields RN who will assume cares at this time.         
Dr Johnson at bedside evaluating epidural catheter. Epidural catheter found disconnected from infusion tubing. Dr Johnson was able to reconnect the epidural catheter and restart infusion.   
Goal Outcome Evaluation:       Patient's VSS.  Up x 1 with assist from 2 RNs to bathroom.  Patient tolerated ambulating well.  No flow noted with fundal exams and fundus is firm U/1.  Scant Flow noted when patient ambulated.  Patient does move well in bed and is not complaining of pain.  Large support group with patient there to support her.                   
Goal Outcome Evaluation:      Plan of Care Reviewed With: patient    Overall Patient Progress: improvingOverall Patient Progress: improving           
Goal Outcome Evaluation:      Plan of Care Reviewed With: patient    Overall Patient Progress: improvingOverall Patient Progress: improving    Vital signs stable and assessments WDL. Incision CDI. Patient ambulating free of dizziness. Barber in and draining adequate amounts. Using ice for incisional comfort. Pain is controlled with tylenol and toradol medications given as prescribed. Nubain given for itchiness. Patient is bonding well with infant.      
Goal Outcome Evaluation:      Plan of Care Reviewed With: patient    Overall Patient Progress: improvingOverall Patient Progress: improving    Vital signs stable and assessments WDL. Steri strips in place, no drainage. Patient is up independently, voiding without difficulty, pain is controlled with tylenol and ibuprofen.  Encouraged to continue to ambulate as able, stay hydrated and to void frequently. Pumping for infant due to infant weight loss and LPT status. Patient is bonding well with infant.    
Goal Outcome Evaluation:      Plan of Care Reviewed With: patient, spouse    Overall Patient Progress: improvingOverall Patient Progress: improving     1340 Admitted from L&D via bed. Arrived with baby and was accompanied by  and arrived with personal belongings. Report was taken from Breonna in L&D.  VSS. Fundus is firm and midline.  Vaginal bleeding is scant rubra.  Lungs clear and bowel sounds hypoactive. Barber patent and draining.  Dressing to lower abdomen clean, dry and intact.  Pneumoboots in place to bilateral lower extremities.  Pt. And her  were oriented to the room and call light system.      
Goal Outcome Evaluation:      Plan of Care Reviewed With: patient, spouse    Overall Patient Progress: improvingOverall Patient Progress: improving     Progressing per care plan. Incision clean, dry and intact with steri strips. Pain controlled with tylenol and ibuprofen. Patient ambulating independently. Patient showered today and tolerated well. Patient reports passing gas. Breastfeeding on cue with minimal RN assist. Started pumping after feedings with hospital grade pump due to infant's weight loss and LPT status. Patient and infant bonding well. Will continue with current plan of care.       
Pt in the bathroom at 835 to try to have a BM. Pt successful but states her Cervidil fell out at this time. Will start pit at 10 am per MD order.  
Pt up in room and on birthing ball. FHT hard to monitor due to frequent maternal movement. Monitors adjusted frequently per RN. Pt states contractions are infrequent and aren't very painful. Will continue to titrate Pit per protocol.   
RN at bedside at 1244 to adjust FHM, fetus very active and RN unable to get FHT on consistently. Genny monitor attempted without success. US and TOCO reapplied and tones were obtained. RN at bedside the whole time. Pt hoping to nap.     
Report received from Kaylee THOMAS at 0715. Care taken over, POC reviewed with pt. Per MD remove Cervidil at 1000 and then start pit. Check cervix once pt is uncomfortable, update MD as needed. Will continue to monitor pt and fetus.   
VSS overnight, afebrile. Comfortable with epidural. CatII strip resolved. Pitocin infusing at 8mu.   
Vital signs stable. Postpartum assessment WDL. Incision c/d/i. Pain controlled with tylenol, ibuprofen, abd binder. Patient ambulating with no assist. Patient reports passing gas and had a bowel movement today. Breastfeeding on cue with no assist also pumping and giving baby EBM as well as formula due to wt loss. Patient and infant bonding well. Will continue with current plan of care.      D: VSS, assessments WDL.   I: Pt. received complete discharge paperwork and home medications as filled by discharge pharmacy here.  Pt. was given times of last dose for all discharge medications in writing on discharge medication sheets.  Discharge teaching included home medication, pain management, activity restrictions, postpartum cares, and signs and symptoms of infection.    A: Discharge outcomes on care plan met.  Mother states understanding and comfort with self and baby cares.  P: Pt. discharged to home.  Pt. was discharged with baby, and bands were checked at time of discharge.  Pt. was accompanied by , nurse and baby, and left with personal belongings.  Pt. to follow up with OB per MD order.  Pt. had no further questions at the time of discharge and no unmet needs were identified.     
2

## 2024-06-07 NOTE — DISCHARGE SUMMARY
[facility]    Discharge Summary  Obstetrics    Date of Admission:  2024  Date of Discharge:  2024  Discharging Provider: Dania Walker MD    Discharge Diagnoses   Primary  section  Prolonged labor  PPROM  ABLA, asymptomatic    Procedure/Surgery Information   Procedure: Procedure(s):   section   Surgeon(s): Surgeons and Role:     * Dania Walker MD - Primary     * Florida Campbell DO - Assisting     History of Present Illness   Kasey Carrera is a 28 year old female who presented with PPROM. IOL was attempted however patient had deep transverse arrest at 8 cm. She required  section and due to difficult extraction T uterine incision required to delivery infant. She is not a candidate for TOLAC with next pregnancy    Hospital Course   The patient's hospital course was unremarkable.  She recovered as anticipated and experienced no post-operative complications.  On discharge, her pain was well controlled. Vaginal bleeding is similar to peak menstrual flow.  Voiding without difficulty.  Ambulating well and tolerating a normal diet.  No fever or significant wound drainage.  Breastfeeding well.  Infant is stable.  She was discharged on post-partum day #2.    Post-partum hemoglobin:   Hemoglobin   Date Value Ref Range Status   2024 9.2 (L) 11.7 - 15.7 g/dL Final       Lena Depression Scale  Thoughts of Harming Self: Never  Total Score: 2      Dania Walker MD    Discharge Disposition   Discharged to home   Condition at discharge: Stable    Pending Results   Final pathology results: Discussed with patient    Primary Care Physician   [facility]    Consultations This Hospital Stay   ANESTHESIOLOGY IP CONSULT  LACTATION IP CONSULT    Discharge Orders      Activity    Activity as tolerated     Reason for your hospital stay    Maternity care     Activity    No lifting more than 25 pounds for 6 weeks     Follow Up    Follow up with  Writer calls pt's mother to inform her of the cancellations today and offer appt for Shirin. Mother expresses gratitude and takes the 1520 appt with Dr. Isac Richey today.    provider in 6 weeks for post-delivery check     Breast Pump DME    Breast Pump Documentation:   Manual/Electric Pump: To support adequate breast milk production and nutrition for infant.     I, the undersigned, certify that the above prescribed supplies are medically necessary for this patient and is both reasonable and necessary in reference to accepted standards of medical and necessary in reference to accepted standards of medical practice in the treatment of this patient's condition and is not prescribed as a convenience.     Diet    Resume previous diet     Discharge Medications   Current Discharge Medication List        START taking these medications    Details   acetaminophen (TYLENOL) 325 MG tablet Take 3 tablets (975 mg) by mouth every 6 hours  Qty: 40 tablet, Refills: 1    Associated Diagnoses: S/P primary low transverse       ibuprofen (ADVIL/MOTRIN) 800 MG tablet Take 1 tablet (800 mg) by mouth every 6 hours  Qty: 30 tablet, Refills: 1    Associated Diagnoses: S/P primary low transverse       oxyCODONE (ROXICODONE) 5 MG tablet Take 1 tablet (5 mg) by mouth every 4 hours as needed for moderate to severe pain  Qty: 10 tablet, Refills: 0    Associated Diagnoses: S/P primary low transverse            CONTINUE these medications which have CHANGED    Details   buPROPion (WELLBUTRIN XL) 300 MG 24 hr tablet Take 1 tablet (300 mg) by mouth every morning  Qty: 30 tablet, Refills: 1    Associated Diagnoses: S/P primary low transverse            CONTINUE these medications which have NOT CHANGED    Details   citalopram (CELEXA) 20 MG tablet Take 30 mg by mouth daily      Magnesium Oxide 250 MG TABS Take 250 mg by mouth      Prenat w/o [id number] (PNV OB+DHA PO)            STOP taking these medications       ondansetron (ZOFRAN ODT) 4 MG ODT tab Comments:   Reason for Stopping:         traZODone (DESYREL) 50 MG tablet Comments:   Reason for Stopping:             Allergies    Allergies   Allergen Reactions    Amoxicillin Hives    Fluoxetine Other (See Comments)     Impulsive, suicide though, drinking, fighting    Sertraline Other (See Comments)     Sleeping 16 hrs

## 2024-06-07 NOTE — PROGRESS NOTES
[facility]    Obstetrics Post-Op / Progress Note    Assessment & Plan   Assessment:  -2 Days Post-Op  Procedure(s):   section    Doing well.  Clean wound without signs of infection.  No immediate surgical complications identified.  No excessive bleeding  Pain well-controlled.      Plan:  Ambulation encouraged  Breast feeding strategies discussed  Monitor wound for signs of infection  Pain control measures as needed  Reportable signs and symptoms dicussed with the patient  Mild Anemia, asymptomatic, resume PNV  Anticipate discharge tomorrow    Dania Walker MD     Interval History   Doing well.  Pain is well-controlled.  No fevers.  No history of wound drainage, warmth or significant erythema.  Good appetite.  Denies chest pain, shortness of breath, nausea or vomiting.  Ambulatory.  breastfeeding with minimal assistance.  Flatus present. Voiding without difficulty.     Medications   Current Facility-Administered Medications   Medication Dose Route Frequency Provider Last Rate Last Admin    dextrose 5% in lactated ringers infusion   Intravenous Continuous Dania Walker  mL/hr at 24 1330 New Bag at 24 1330    No Tdap Needed - Assessment: Patient does not need Tdap vaccine   Does not apply Continuous PRN Dania Walker MD        oxytocin (PITOCIN) 30 units in 500 mL 0.9% NaCl infusion  340 mL/hr Intravenous Continuous PRN Dania Walker MD        oxytocin (PITOCIN) 30 units in 500 mL 0.9% NaCl infusion  100-340 mL/hr Intravenous Continuous PRN Hellen Long MD         Current Facility-Administered Medications   Medication Dose Route Frequency Provider Last Rate Last Admin    acetaminophen (TYLENOL) tablet 975 mg  975 mg Oral Q6H Dania Walker MD   975 mg at 24 0424    buPROPion (WELLBUTRIN XL) 24 hr tablet 300 mg  300 mg Oral QAM Hellen Long MD   300 mg at 24 1015    citalopram (celeXA) tablet 30 mg  30 mg Oral Daily Hellen Long MD    30 mg at 06/06/24 1015    ibuprofen (ADVIL/MOTRIN) tablet 800 mg  800 mg Oral Q6H Dania Walker MD   800 mg at 06/07/24 0140    senna-docusate (SENOKOT-S/PERICOLACE) 8.6-50 MG per tablet 1 tablet  1 tablet Oral BID Dania Walker MD   1 tablet at 06/06/24 1014    Or    senna-docusate (SENOKOT-S/PERICOLACE) 8.6-50 MG per tablet 2 tablet  2 tablet Oral BID Dania Walker MD   2 tablet at 06/06/24 1944    sodium chloride (PF) 0.9% PF flush 3 mL  3 mL Intracatheter Q8H Dania Walker MD   3 mL at 06/06/24 0118       Physical Exam   Temp: 98  F (36.7  C) Temp src: Oral BP: 113/61 Pulse: 78   Resp: 16 SpO2: 98 % O2 Device: None (Room air)    Vitals:    06/02/24 1645   Weight: 93.9 kg (207 lb)     Vital Signs with Ranges  Temp:  [97.8  F (36.6  C)-98  F (36.7  C)] 98  F (36.7  C)  Pulse:  [69-83] 78  Resp:  [16-18] 16  BP: ()/(56-61) 113/61  SpO2:  [97 %-98 %] 98 %  I/O last 3 completed shifts:  In: 400 [P.O.:400]  Out: 1300 [Urine:1300]    Uterine fundus is firm, non-tender and at the level of the umbilicus  Incision C/D/I  Extremities Non-tender  Heart is regular rate and rhythm and lungs clear to auscultation    Data   Recent Labs   Lab Test 06/02/24  1806   AS Negative     Recent Labs   Lab Test 06/06/24  1148 06/04/24  2145   HGB 9.2* 11.5*     Recent Labs   Lab Test 12/06/23  0812   RUQIGG No detectable antibody.

## 2024-07-26 NOTE — PROGRESS NOTES
SUBJECTIVE:  Kasey Carrera,  is here for a postpartum visit.  She had a  Section  on 24 delivering a healthy baby girl, named Aggie weighing 6 lbs 8.4 oz at term.      HPI:  Overall is doing well.  Would like to discuss non-hormonal birth control options.    Last PHQ-9 score on record=       2024    10:47 AM   PHQ-9 SCORE   PHQ-9 Total Score 8         10/27/2023    10:15 PM 1/10/2024     8:44 AM 2024    10:47 AM   ANNA-7 SCORE   Total Score 5 (mild anxiety)     Total Score 5 6 7       Pap: (No results found for:  23 NIL found in Care Everywhere    Delivery complications:  Yes, arrest of descent and dilation. PPROM @ 36w3d. Uterine atony. Baby was delivered breech due to deeply impacted head. Had to T uterine incision. Had Rasheeda placed due to atony.  Breast feeding:  Yes, no issues. Sometimes feels leg tingling.   Bladder problems:  No  Bowel problems/hemorrhoids:  No  Episiotomy/laceration/incision healed? Yes  Vaginal flow:  None  Ralls:  Yes, protected  Contraception: would prefer to avoid hormonal options. Feels like these really impacted mental health. [facility] very poor with the Nexplanon.   Emotional adjustment:  doing well and happy  Back to work:  part time end of August back to full time end of  point review of systems negative other than symptoms noted below or in the HPI.    OBJECTIVE:  Vitals: /60   Wt 86.3 kg (190 lb 3.2 oz)   LMP 09/15/2023   BMI 28.92 kg/m    BMI= Body mass index is 28.92 kg/m .  General - pleasant female in no acute distress.  Breast -  symmetric and no skin changes  Abdomen - Incision well-healed  Pelvic - deferred  Rectovaginal - deferred.    ASSESSMENT:  No diagnosis found.    PLAN:  May resume normal activities without restrictions.  Pap smear was not done today. Due in     Full counseling was provided, and all questions were answered.   Return to clinic in one year for an annual visit.     There are no Patient  Instructions on file for this visit.  Dania Walker MD

## 2024-07-29 ENCOUNTER — PRENATAL OFFICE VISIT (OUTPATIENT)
Dept: OBGYN | Facility: CLINIC | Age: 29
End: 2024-07-29
Attending: OBSTETRICS & GYNECOLOGY
Payer: COMMERCIAL

## 2024-07-29 VITALS — BODY MASS INDEX: 28.92 KG/M2 | DIASTOLIC BLOOD PRESSURE: 60 MMHG | WEIGHT: 190.2 LBS | SYSTOLIC BLOOD PRESSURE: 116 MMHG

## 2024-07-29 DIAGNOSIS — Z30.09 COUNSELING FOR INITIATION OF BIRTH CONTROL METHOD: Primary | ICD-10-CM

## 2024-07-29 PROCEDURE — 99207 PR POST PARTUM EXAM: CPT | Performed by: OBSTETRICS & GYNECOLOGY

## 2024-07-29 RX ORDER — CITALOPRAM HYDROBROMIDE 40 MG/1
TABLET ORAL
COMMUNITY
Start: 2024-07-11

## 2024-07-29 RX ORDER — DIAPHRAGMS, CONTOURED 65 MM-80MM
1 DIAPHRAGM VAGINAL PRN
Qty: 1 EACH | Refills: 0 | Status: SHIPPED | OUTPATIENT
Start: 2024-07-29

## 2024-07-29 ASSESSMENT — ANXIETY QUESTIONNAIRES
7. FEELING AFRAID AS IF SOMETHING AWFUL MIGHT HAPPEN: SEVERAL DAYS
6. BECOMING EASILY ANNOYED OR IRRITABLE: MORE THAN HALF THE DAYS
IF YOU CHECKED OFF ANY PROBLEMS ON THIS QUESTIONNAIRE, HOW DIFFICULT HAVE THESE PROBLEMS MADE IT FOR YOU TO DO YOUR WORK, TAKE CARE OF THINGS AT HOME, OR GET ALONG WITH OTHER PEOPLE: SOMEWHAT DIFFICULT
GAD7 TOTAL SCORE: 7
GAD7 TOTAL SCORE: 7
5. BEING SO RESTLESS THAT IT IS HARD TO SIT STILL: NOT AT ALL
3. WORRYING TOO MUCH ABOUT DIFFERENT THINGS: SEVERAL DAYS
1. FEELING NERVOUS, ANXIOUS, OR ON EDGE: SEVERAL DAYS
2. NOT BEING ABLE TO STOP OR CONTROL WORRYING: SEVERAL DAYS

## 2024-07-29 ASSESSMENT — PATIENT HEALTH QUESTIONNAIRE - PHQ9
SUM OF ALL RESPONSES TO PHQ QUESTIONS 1-9: 8
5. POOR APPETITE OR OVEREATING: SEVERAL DAYS

## 2024-07-31 PROBLEM — O09.90 SUPERVISION OF HIGH-RISK PREGNANCY: Status: RESOLVED | Noted: 2023-10-30 | Resolved: 2024-07-31

## 2024-07-31 PROBLEM — O42.919 PRETERM PREMATURE RUPTURE OF MEMBRANES (PPROM) DELIVERED, CURRENT HOSPITALIZATION: Status: RESOLVED | Noted: 2024-06-07 | Resolved: 2024-07-31

## 2024-07-31 PROBLEM — D62 ABLA (ACUTE BLOOD LOSS ANEMIA): Status: RESOLVED | Noted: 2024-06-07 | Resolved: 2024-07-31

## 2024-07-31 PROBLEM — Z98.891 S/P PRIMARY LOW TRANSVERSE C-SECTION: Status: RESOLVED | Noted: 2024-06-07 | Resolved: 2024-07-31

## 2025-01-06 ENCOUNTER — NURSE TRIAGE (OUTPATIENT)
Dept: NURSING | Facility: CLINIC | Age: 30
End: 2025-01-06
Payer: COMMERCIAL

## 2025-01-07 ENCOUNTER — TELEPHONE (OUTPATIENT)
Dept: OPHTHALMOLOGY | Facility: CLINIC | Age: 30
End: 2025-01-07
Payer: COMMERCIAL

## 2025-01-07 NOTE — TELEPHONE ENCOUNTER
"Nurse Triage SBAR    Is this a 2nd Level Triage? NO    Situation: Unequal pupils     Background: Pt reports she first noticed symptoms this am when she woke up. Denies any recent exposure to Scopolamine patches.    Assessment:  Reports one of her pupils is very large and one is tiny. [place] any vision changes or swelling. States both eyes still react properly to light. Reports one eye is a little red under her pupil but states that she thinks it was from when she adjusted her contact. Denies any light sensitivity.     Protocol Recommended Disposition:   Home Care    Recommendation:  Home care. Advise pt calling back for any change in vision, pain in her eyes, or light sensitivity. Protocol and care advice reviewed. Advised to call back with any new or worsening signs, symptoms, concerns, or questions. They verbalized understanding and agreed to follow advice given.    Reason for Disposition   Normal unequal pupils (i.e., longstanding; no concerning symptoms), questions about    Additional Information   Negative: SEVERE eye pain   Negative: Complete loss of vision in one or both eyes   Negative: [1] Eyelids are very swollen (shut or almost) AND [2] fever   Negative: [1] Eyelid (outer) is very red AND [2] fever   Negative: [1] Foreign body sensation (\"feels like something is in there\") AND [2] irrigation didn't help   Negative: Vomiting   Negative: Ulcer or sore seen on the cornea (clear center part of the eye)   Negative: [1] Recent eye surgery AND [2] increasing eye pain   Negative: [1] Blurred vision AND [2] new or worsening   Negative: Patient sounds very sick or weak to the triager   Negative: MODERATE eye pain or discomfort (e.g., interferes with normal activities or awakens from sleep; more than mild)   Negative: [1] Eyelids are very swollen (shut or almost) AND [2] no fever   Negative: [1] Painful rash near eye AND [2] multiple small blisters grouped together   Negative: Pain followed bright light exposure from " "welding   Negative: Looking at light causes MODERATE to SEVERE eye pain (i.e., photophobia)   Negative: Yellow or green pus occurs   Negative: Eye pain present > 24 hours   Negative: [1] MILD eyelid  pain is a recurrent problem AND [2] red and crusty eyelids   Negative: MILD eye pains are a recurrent problem   Negative: [1] Mild eye pain caused by sunscreen, smoke, smog, chlorine, food, soap or other mild irritant AND [2] no blurred vision   Negative: [1] Mild eye pain caused by sun lamp or excessive sun exposure AND [2] no blurred vision   Negative: [1] Mild eye pain caused by wearing contact lenses AND [2] no blurred vision   Negative: [1] Mild eye pain with foreign body sensation (\"feels like something is in there\") AND [2] has not attempted irrigation   Negative: [1] Mild eye pains are a recurrent problem AND [2] related to staring at computer screen   Negative: [1] Mild eye pain AND [2] present < 24 hours    Protocols used: Eye Pain and Other Symptoms-A-AH    "

## 2025-01-07 NOTE — TELEPHONE ENCOUNTER
Called and spoke to Kasey     Made an appointment for 1/9 @ 1030 am with Dr. Garibay     Discussed     Wait time     Billing / insurance     Clinic address     Parking     Preeti Nunez Communication Facilitator on 1/7/2025 at 4:27 PM

## 2025-01-07 NOTE — TELEPHONE ENCOUNTER
[facility] Call Center    Phone Message    May a detailed message be left on voicemail: yes     Reason for Call: Appointment Intake    Referring Provider Name: [facility]      Diagnosis and/or Symptoms: Anisocoria    Pt was in the ER today with [facility], they told her to call and get scheduled with Dr. Garibay, pt would like to schedule, please review for hospital follow up    Action Taken: Message routed to:  [facility]    Travel Screening: Not Applicable     Date of Service:

## 2025-01-07 NOTE — TELEPHONE ENCOUNTER
Pt calling to report that she now has a headache behind her eyes, and she would like to be seen. As pt has not yet established with [facility], triage directed pt to [facility], and pt said she would try to be seen with her current PCP for her acute change today.    Triage encouraged her to use UC still if she is not able to get in with her PCP, and to call us when she is ready to establish with the [place] clinic. Pt verbalized understanding.    Kayla Doyle RN      Reason for Disposition   Patient wants to be seen    Additional Information   Negative: Followed an eye injury   Negative: Eye pain from chemical in the eye   Negative: Eye pain from foreign body in eye   Negative: Has sinus pain or pressure   Negative: SEVERE eye pain   Negative: Complete loss of vision in one or both eyes   Negative: Eyelids are very swollen (shut or almost) and fever   Negative: Eyelid (outer) is very red and fever   Negative: Foreign body sensation ('feels like something is in there') and irrigation didn't help   Negative: Vomiting   Negative: Ulcer or sore seen on the cornea (clear center part of the eye)   Negative: Recent eye surgery and increasing eye pain   Negative: Blurred vision AND new-onset or getting worse   Negative: Patient sounds very sick or weak to the triager    Protocols used: Eye Pain and Other Symptoms-A-OH

## 2025-01-08 ENCOUNTER — TRANSCRIBE ORDERS (OUTPATIENT)
Dept: OTHER | Age: 30
End: 2025-01-08

## 2025-01-08 DIAGNOSIS — H53.10 SUBJECTIVE VISUAL DISTURBANCE: Primary | ICD-10-CM

## 2025-01-08 DIAGNOSIS — H57.02 ANISOCORIA: Primary | ICD-10-CM

## 2025-01-09 ENCOUNTER — OFFICE VISIT (OUTPATIENT)
Dept: OPHTHALMOLOGY | Facility: CLINIC | Age: 30
End: 2025-01-09
Attending: OPHTHALMOLOGY
Payer: COMMERCIAL

## 2025-01-09 DIAGNOSIS — H57.02 ANISOCORIA: ICD-10-CM

## 2025-01-09 PROCEDURE — 99214 OFFICE O/P EST MOD 30 MIN: CPT | Performed by: OPHTHALMOLOGY

## 2025-01-09 ASSESSMENT — VISUAL ACUITY
OS_CC+: +2
CORRECTION_TYPE: GLASSES
OS_CC: 20/25
METHOD: SNELLEN - LINEAR
OD_CC+: -1
OD_CC: 20/25

## 2025-01-09 ASSESSMENT — REFRACTION_WEARINGRX
OD_SPHERE: -1.50
SPECS_TYPE: SVL
OS_CYLINDER: +1.50
OS_SPHERE: -3.00
OS_AXIS: 089
OD_AXIS: 087
OD_CYLINDER: +4.25

## 2025-01-09 ASSESSMENT — TONOMETRY
IOP_METHOD: ICARE
OS_IOP_MMHG: 11
OD_IOP_MMHG: 11

## 2025-01-09 ASSESSMENT — CONF VISUAL FIELD
OD_INFERIOR_TEMPORAL_RESTRICTION: 0
OS_SUPERIOR_TEMPORAL_RESTRICTION: 0
OS_NORMAL: 1
OS_INFERIOR_TEMPORAL_RESTRICTION: 0
OD_SUPERIOR_NASAL_RESTRICTION: 0
OD_INFERIOR_NASAL_RESTRICTION: 0
OS_INFERIOR_NASAL_RESTRICTION: 0
OD_NORMAL: 1
OD_SUPERIOR_TEMPORAL_RESTRICTION: 0
METHOD: COUNTING FINGERS
OS_SUPERIOR_NASAL_RESTRICTION: 0

## 2025-01-09 NOTE — LETTER
"2025     RE:  :  MRN: Kasey Carrera  1995  [id number]     Dear MELANY Don    Thank you for asking me to see your very pleasant patient,Kasey Carrera, in neuro-ophthalmic consultation.  I would like to thank you for sending your records and I have summarized them in the history of present illness.   My assessment and plan are below.  For further details, please see my attached clinic note.      Kasey Carrera is a 29 year old female with the following diagnoses:   1. Anisocoria       Patient was sent for consultation by Shazia Don PA-C for \"extra dilated pupil with very sluggish reaction.\"    HPI:  Patient first noted her LEFT pupil was dilated on  while doing her makeup. This is the first time she every noticed this change.    She denies any other symptoms or discernible triggers. Denies trauma, surgeries, redness, tearing, numbness, visual changes, facial changes, dysarthria, dysphagia, hearing or balance.    Two weeks ago she had URI symptoms (COVID negative). Urgent Care found \"fluid behind her ears.\" She was prescribed Sudafed & Flonase. She has since fully recovered.    She reports history of intermittent headaches since 6 years old. Only 1 event considered a migraine with light & sound sensitivity that improved with rest lasting only a couple hours. Her typical headaches are 4/10, lasting several hours to <1 day without Tylenol but immediatly with Tylenol. Occur 4-5 per month.    Ocular history: Bilateral astigmatism, worse in RIGHT eye > LEFT eye.  Drops: None    Independent historians: Patient    Review of outside testing:  MRI BRAIN 2025  Impression:   1. No evidence of acute intracranial abnormality.   2. Small nonspecific focus of white matter signal change at the right frontal lobe, potentially sequela of migraine headaches, previous trauma, or other remote insult.   3. Lobulated maxillary sinus mucosal thickening, left worse than right.     MRA BRAIN " 1/7/2025  Impression:   Unremarkable MRA of the head as far as visualized.     My interpretation performed today of outside testing:  No abnormality within the orbit or brainstem    Past medical history:  -Depression & Anxiety    Medications:   Acetaminophen  BuPROPion  Citalopram  Ibuprofen  Magnesium Oxide Tabs  PNV OB+DHA PO    Family history / social history:  Patient's family history includes Depression in her sister.     Patient  reports that she has never smoked. She has never used smokeless tobacco. She reports that she does not currently use alcohol. She reports that she does not use drugs.     Exam:  Visual acuity 20/25 right eye 20/25 left eye.  Color vision is full in both eyes.  Pupils: Her left pupil is large and poorly reactive to light.  There is reacted much better to accommodation.  The left pupil showed vermiform movements.  The right pupil is normal.  Intraocular pressure 11 right eye and 11 left eye.  Anterior segment exam unremarkalbe.  Fundus exam unremarkable.  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  Her pupil shows vermiform movements along with light near dissociation.  There is no ptosis and there is no ophthalmoplegia to suggest a 3rd nerve palsy or Ariana syndrome.  In addition a Ariana syndrome does not cause a sluggish pupil.  She has deep tendon reflexes intact.  She denies symptoms of dysautonomia.  Reassurance was given.  Follow-up as needed.      Again, thank you for allowing me to participate in the care of your patient.      Sincerely,    Zaheer Garibay MD  Professor  Director of Neuro-Ophthalmology  [facility] [facility] [employer] Endowed Chair  Departments of Ophthalmology, Neurology, and Neurosurgery  [facility] [address] [place]  [address]  T - [phone]   - [phone]  E -  [email or web address]      CC: Shazia Don PA-C  [facility]  [address] [place] [address]  Via Fax: [phone]    DX = tonic pupil

## 2025-01-09 NOTE — NURSING NOTE
Chief Complaints and History of Present Illnesses   Patient presents with    Anisocoria     Chief Complaint(s) and History of Present Illness(es)       Anisocoria               Comments    Pt here to follow up from ED for headaches and Anisocoria. No vision changes. Pt feels like she sees well with her glasses. No diplopia.  No DM.    MELISSA Bauer January 9, 2025 10:38 AM

## 2025-01-09 NOTE — PROGRESS NOTES
"     Kasey Carrera is a 29 year old female with the following diagnoses:   1. Anisocoria         Patient was sent for consultation by Dr. Shazia Don for \"extra dilated pupil with very sluggish reaction.\"      HPI:    Patient first noted her LEFT pupil was dilated on Monday 1/6 while doing her makeup. This is the first time she every noticed this change.    She denies any other symptoms or discernible triggers. Denies trauma, surgeries, redness, tearing, numbness, visual changes, facial changes, dysarthria, dysphagia, hearing or balance.    Two weeks ago she had URI symptoms (COVID negative). Urgent Care found \"fluid behind her ears.\" She was prescribed Sudafed & Flonase. She has since fully recovered.    She reports history of intermittent headaches since 6 years old. Only 1 event considered a migraine with light & sound sensitivity that improved with rest lasting only a couple hours. Her typical headaches are 4/10, lasting several hours to <1 day without Tylenol but immediatly with Tylenol. Occur 4-5 per month.    Ocular history: Bilateral astigmatism, worse in RIGHT eye > LEFT eye.  Drops: None      Independent historians:  Patient    Review of outside testing:  MRI BRAIN 1/7/2025  Impression:   1. No evidence of acute intracranial abnormality.   2. Small nonspecific focus of white matter signal change at the right frontal lobe, potentially sequela of migraine headaches, previous trauma, or other remote insult.   3. Lobulated maxillary sinus mucosal thickening, left worse than right.     MRA BRAIN 1/7/2025  Impression:   Unremarkable MRA of the head as far as visualized.       My interpretation performed today of outside testing:  No abnormality within the orbit or brainstem    Past medical history:  -Depression & Anxiety    Medications:   Acetaminophen  BuPROPion  Citalopram  Ibuprofen  Magnesium Oxide Tabs  PNV OB+DHA PO    Family history / social history:  Patient's family history includes Depression in her " sister.     Patient  reports that she has never smoked. She has never used smokeless tobacco. She reports that she does not currently use alcohol. She reports that she does not use drugs.     Exam:  Visual acuity 20/25 right eye 20/25 left eye.  Color vision is full in both eyes.  Pupils: Her left pupil is large and poorly reactive to light.  There is reacted much better to accommodation.  The left pupil showed vermiform movements.  The right pupil is normal.  Intraocular pressure 11 right eye and 11 left eye.  Anterior segment exam unremarkalbe.  Fundus exam unremarkable.  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  Her pupil shows vermiform movements along with light near dissociation.  There is no ptosis and there is no ophthalmoplegia to suggest a 3rd nerve palsy or Ariana syndrome.  In addition a Ariana syndrome does not cause a sluggish pupil.  She has deep tendon reflexes intact.  She denies symptoms of dysautonomia.  Reassurance was given.  Follow-up as needed.               Attending Physician Attestation:  Complete documentation of historical and exam elements from today's encounter can be found in the full encounter summary report (not reduplicated in this progress note).  I personally obtained the chief complaint(s) and history of present illness.  I confirmed and edited as necessary the review of systems, past medical/surgical history, family history, social history, and examination findings as documented by others; and I examined the patient myself.  I personally reviewed the relevant tests, images, and reports as documented above.  I formulated and edited as necessary the assessment and plan and discussed the findings and management plan with the patient and family. - Zaheer Gayle, DO  Neurology Resident  PGY4  01/09/2025

## 2025-01-19 ENCOUNTER — HEALTH MAINTENANCE LETTER (OUTPATIENT)
Age: 30
End: 2025-01-19

## 2025-01-23 ENCOUNTER — HOSPITAL ENCOUNTER (EMERGENCY)
Facility: CLINIC | Age: 30
Discharge: HOME | End: 2025-01-23
Attending: EMERGENCY MEDICINE
Payer: COMMERCIAL

## 2025-01-23 ENCOUNTER — NURSE TRIAGE (OUTPATIENT)
Dept: FAMILY MEDICINE | Facility: CLINIC | Age: 30
End: 2025-01-23
Payer: COMMERCIAL

## 2025-01-23 VITALS
HEART RATE: 90 BPM | OXYGEN SATURATION: 98 % | DIASTOLIC BLOOD PRESSURE: 79 MMHG | BODY MASS INDEX: 27.28 KG/M2 | TEMPERATURE: 97.5 F | WEIGHT: 180 LBS | HEIGHT: 68 IN | SYSTOLIC BLOOD PRESSURE: 115 MMHG | RESPIRATION RATE: 16 BRPM

## 2025-01-23 DIAGNOSIS — J10.1 INFLUENZA A: ICD-10-CM

## 2025-01-23 LAB
FLUAV RNA SPEC QL NAA+PROBE: POSITIVE
FLUBV RNA RESP QL NAA+PROBE: NEGATIVE
RSV RNA SPEC NAA+PROBE: NEGATIVE
S PYO DNA THROAT QL NAA+PROBE: NOT DETECTED
SARS-COV-2 RNA RESP QL NAA+PROBE: NEGATIVE

## 2025-01-23 PROCEDURE — 87637 SARSCOV2&INF A&B&RSV AMP PRB: CPT | Performed by: EMERGENCY MEDICINE

## 2025-01-23 PROCEDURE — 87651 STREP A DNA AMP PROBE: CPT | Performed by: EMERGENCY MEDICINE

## 2025-01-23 PROCEDURE — 99283 EMERGENCY DEPT VISIT LOW MDM: CPT

## 2025-01-23 RX ORDER — ONDANSETRON 4 MG/1
4-8 TABLET, ORALLY DISINTEGRATING ORAL EVERY 8 HOURS PRN
Qty: 12 TABLET | Refills: 0 | Status: SHIPPED | OUTPATIENT
Start: 2025-01-23 | End: 2025-01-26

## 2025-01-23 ASSESSMENT — COLUMBIA-SUICIDE SEVERITY RATING SCALE - C-SSRS
1. IN THE PAST MONTH, HAVE YOU WISHED YOU WERE DEAD OR WISHED YOU COULD GO TO SLEEP AND NOT WAKE UP?: NO
2. HAVE YOU ACTUALLY HAD ANY THOUGHTS OF KILLING YOURSELF IN THE PAST MONTH?: NO
6. HAVE YOU EVER DONE ANYTHING, STARTED TO DO ANYTHING, OR PREPARED TO DO ANYTHING TO END YOUR LIFE?: NO

## 2025-01-23 ASSESSMENT — ACTIVITIES OF DAILY LIVING (ADL)
ADLS_ACUITY_SCORE: 48
ADLS_ACUITY_SCORE: 48

## 2025-01-23 NOTE — TELEPHONE ENCOUNTER
Pt called clinic, noting both her child have URIs at this time.  Temp today was 102 - child's is 103.   Took Motrin for fever relief today.  Productive cough - sputum is clear or green.  Pt notes she is weak to the point she cannot stand.     Advised to call  now.   Pt declined - stating her  will drive her to ED.  Provided education on disposition and reasoning for EMS vs a  to ED.  Provided home care advice for URI symptoms.   All questions were answered.     Advised to call back if symptoms worsen or new symptoms arise. Red flag symptoms reviewed in detail.     Anabell DERAS RN       Reason for Disposition   Very weak (can't stand)    Additional Information   Negative: SEVERE difficulty breathing (e.g., struggling for each breath, speaks in single words)    Protocols used: Common Cold-A-OH

## 2025-01-23 NOTE — ED TRIAGE NOTES
Flu like symptoms with fever since Tuesday, been exposed to someone with similar symptoms and on Tamaflu. Having sore throat and productive cough.      Triage Assessment (Adult)       Row Name 01/23/25 1001          Triage Assessment    Airway WDL WDL        Respiratory WDL    Respiratory WDL X        Skin Circulation/Temperature WDL    Skin Circulation/Temperature WDL WDL        Cardiac WDL    Cardiac WDL WDL        Peripheral/Neurovascular WDL    Peripheral Neurovascular WDL WDL        Cognitive/Neuro/Behavioral WDL    Cognitive/Neuro/Behavioral WDL WDL

## 2025-01-23 NOTE — DISCHARGE INSTRUCTIONS
*No school or work until you have been without a fever for 24 hours without tylenol or motrin.  *Take medications as prescribed.  Ibuprofen and/or tylenol for pain.  Zofran for nausea and vomiting. Continue your current medications  *Follow-up with your doctor for a recheck in 2-3 days.    *Return if you develop difficulty breathing, neck stiffness, confusion status changes are unable to keep fluids down, faint or feel like you will faint or become worse in any way.    Discharge Instructions  Influenza    You were diagnosed today with influenza or influenza like illness.  Influenza is a respiratory illness caused by influenza A or B viruses.  Influenza causes fever, headache, muscle aches, and fatigue.  These symptoms start one to four days after you have been around a person with this illness.  People with influenza commonly have a dry cough, sore throat, and a runny nose.   Influenza is spread through sneezing and coughing and can live on surfaces for several days.  It is usually contagious for 5 days but in some cases up to 10 days and often affects several family members. If you have a family member who is less than 2 years old, older than 65 years old, pregnant or has a serious medical condition, they should be seen right away by a physician to decide if they should take preventative medications.      Return to the Emergency Department if:  You have trouble breathing  You develop pain in your chest  You have signs of being dehydrated, such as dizziness or unable to urinate at least three times daily   You feel confused  You cannot stop vomiting or you cannot drink enough fluids    In children, you should seek help if the child has any of the above or if child:  Has blue or purplish skin color  Is so irritable that he or she does not want to be held  Does not have tears when crying (in infants) or does not urinate at least three times daily  Does not wake up easily  Follow-up with your doctor if you are not  improving after 5 days    What can I do to help myself?  Rest  Fluids -- Drink hydrating solutions such as Gatorade or pedialyte as often as you can. If you are drinking enough, you should pass urine at least every eight hours.  Acetaminophen and Ibuprofen (such as Tylenol  or Advil) can relieve fever, headache, and muscle aches. Do not give Aspirin to children under 18 years old.   Antiviral treatment -- Antiviral medicines do not make the flu symptoms go away immediately.  They have only been shown to make the symptoms go away 12 to 24 hours sooner than they would without treatment.     Antibiotics -- Antibiotics are NOT useful for treating viral illnesses such as influenza. Antibiotics should only used if there is a bacterial complication of the flu such as bacterial pneumonia, ear infection, or sinusitis.  Because you were diagnosed with a flu like illness you are very contagious.  This means you cannot work, attend school or  for at least 24 hours or until you no longer have a fever.    Remember that you can always come back to the Emergency Department if you are not able to see your regular doctor in the amount of time listed above, if you get any new symptoms, or if there is anything that worries you.

## 2025-01-23 NOTE — ED PROVIDER NOTES
"  Emergency Department Note      History of Present Illness     Chief Complaint   Flu Symptoms and Fever    HPI   Kasey Carrera is a 29 year old female who presents to the ED for flu symptoms and fever. Kasey reports waking up with flu like symptoms 2 days ago. These symptoms include fever, cough, chills, rhinorrhea, diarrhea, and body aches. She states that she did have exposure to her friend who is positive for the flu. She denies any vomiting. Further denies any risk of pregnancy. She states she has no other significant medical issues.     Independent Historian   None    Review of External Notes   Reviewed office visit note from 2024.  Patient was seen for initiation of birth control.    Past Medical History     Medical History and Problem List   MDD  ANNA    Medications   Wellbutrin  Caya  Celexa     Surgical History   Past Surgical History:   Procedure Laterality Date    ANTERIOR CRUCIATE LIGAMENT REPAIR Left     x 3    APPENDECTOMY       SECTION N/A 2024    Procedure:  section;  Surgeon: Dania Walker MD;  Location:  L+D    [facility] CURETT[facility]       and        Physical Exam     Patient Vitals for the past 24 hrs:   BP Temp Temp src Pulse Resp SpO2 Height Weight   25 0959 115/79 97.5  F (36.4  C) Temporal 90 16 98 % 1.727 m (5' 8\") 81.6 kg (180 lb)     Physical Exam  General: Well-nourished, appears to be uncomfortable when I enter the room  Eyes: PERRL, conjunctivae pink no scleral icterus or conjunctival injection  ENT:  Moist mucus membranes, posterior oropharynx mildly erythematous without exudates or edema.   Respiratory:  Lungs clear to auscultation bilaterally, no crackles/rubs/wheezes.  Good air movement.  Normal work of breathing.  +Cough  CV: Normal rate and rhythm, no murmurs/rubs/gallops  GI:  Abdomen soft and non-distended.  Normoactive BS.  No tenderness, guarding or rebound  Skin: Warm, dry.  No rashes or petechiae  Musculoskeletal: No peripheral " edema or calf tenderness  Neuro: Alert and oriented to person/place/time.  Neck supple.  Psychiatric: Tearful affect      Diagnostics     Lab Results   Labs Ordered and Resulted from Time of ED Arrival to Time of ED Departure   INFLUENZA A/B, RSV AND SARS-COV2 PCR - Abnormal       Result Value    Influenza A PCR Positive (*)     Influenza B PCR Negative      RSV PCR Negative      SARS CoV2 PCR Negative     GROUP A STREPTOCOCCUS PCR THROAT SWAB - Normal    Group A strep by PCR Not Detected         Imaging   No orders to display     Independent Interpretation   None    ED Course      Medications Administered   Medications - No data to display    Procedures   Procedures     Discussion of Management   None    ED Course   ED Course as of 01/23/25 1140   Thu Jan 23, 2025   1047 I obtained history and examined the patient as noted above.     1108 I rechecked the patient for discharge.       Additional Documentation  None    Medical Decision Making / Diagnosis     CMS Diagnoses: None    MIPS       None    MDM   Kasey Carrera is a 29 year old female who presents for evaluation of cough and fever.  They also have myalgias. This is consistent with an influenza and she also had a known exposure.  Viral testing for COVID and RSV were negative.  Testing for strep was also negative.  The patient is unfortunately out of treatment window for influenza and so symptomatic care was discussed with her.  I did provide her prescription for Zofran should she develop nausea and vomiting.  She is to take ibuprofen or Tylenol for pain and fever.  She declined a work note. She is at risk for pneumonia but no signs of this are detected on today's visit.  she is not dehydrated and work of breathing is normal. Close followup of primary care physician is indicated and return to the ED for high fevers > 103 for more than 48 hours more, increasing productive cough, shortness of breath, or confusion.      Disposition   The patient was discharged.      Diagnosis     ICD-10-CM    1. Influenza A  J10.1            Discharge Medications   New Prescriptions    ONDANSETRON (ZOFRAN ODT) 4 MG ODT TAB    Take 1-2 tablets (4-8 mg) by mouth every 8 hours as needed for vomiting or nausea.         Scribe Disclosure:  DOMINIC, Annamaria Simental, am serving as a scribe at 10:58 AM on 1/23/2025 to document services personally performed by Ca Benrard MD based on my observations and the provider's statements to me.        Ca Bernard MD  01/23/25 1140

## (undated) DEVICE — DECANTER BAG 2002S

## (undated) DEVICE — DRSG STERI STRIP 1/2X4" R1547

## (undated) DEVICE — GLOVE BIOGEL PI MICRO SZ 6.0 48560

## (undated) DEVICE — GLOVE BIOGEL PI MICRO SZ 5.5 48555

## (undated) DEVICE — SOL WATER IRRIG 1000ML BOTTLE 2F7114

## (undated) DEVICE — SUCTION TRAP DELEE 10FR 20ML

## (undated) DEVICE — ESU GROUND PAD UNIVERSAL W/O CORD

## (undated) DEVICE — SYSTEM HEMORRHAGE CNTRL JADA SYS 2.0 VAC-INDUCED 1047196

## (undated) DEVICE — LINEN C-SECTION 5415

## (undated) DEVICE — SOL WATER IRRIG 1000ML BOTTLE 07139-09

## (undated) DEVICE — PREP CHLORAPREP 26ML TINTED HI-LITE ORANGE 930815

## (undated) DEVICE — PACK C-SECTION LF PL15OTA83B

## (undated) DEVICE — GLOVE BIOGEL PI MICRO INDICATOR UNDERGLOVE SZ 6.5 48965

## (undated) DEVICE — GOWN LG DISP 9515

## (undated) DEVICE — BLADE CLIPPER 4406

## (undated) DEVICE — SOL NACL 0.9% IRRIG 1000ML BOTTLE 07138-09

## (undated) DEVICE — DRAPE IOBAN C-SECTION W/POUCH 30X35" 6657

## (undated) DEVICE — CATH TRAY FOLEY 16FR BARDEX W/DRAIN BAG STATLOCK 300316A

## (undated) DEVICE — SURGICEL POWDER ABSORBABLE HEMOSTAT 3GM 3013SP

## (undated) DEVICE — SU VICRYL 0 CTX 36" J370H

## (undated) DEVICE — SU MONOCRYL 4-0 PS-2 18" UND Y496G

## (undated) DEVICE — SU VICRYL 0 CT-1 36" J346H

## (undated) RX ORDER — OXYTOCIN/0.9 % SODIUM CHLORIDE 30/500 ML
PLASTIC BAG, INJECTION (ML) INTRAVENOUS
Status: DISPENSED
Start: 2024-06-05

## (undated) RX ORDER — MORPHINE SULFATE 1 MG/ML
INJECTION, SOLUTION EPIDURAL; INTRATHECAL; INTRAVENOUS
Status: DISPENSED
Start: 2024-06-05

## (undated) RX ORDER — ONDANSETRON 2 MG/ML
INJECTION INTRAMUSCULAR; INTRAVENOUS
Status: DISPENSED
Start: 2024-06-05

## (undated) RX ORDER — DEXAMETHASONE SODIUM PHOSPHATE 4 MG/ML
INJECTION, SOLUTION INTRA-ARTICULAR; INTRALESIONAL; INTRAMUSCULAR; INTRAVENOUS; SOFT TISSUE
Status: DISPENSED
Start: 2024-06-05

## (undated) RX ORDER — TRANEXAMIC ACID 10 MG/ML
INJECTION, SOLUTION INTRAVENOUS
Status: DISPENSED
Start: 2024-06-05

## (undated) RX ORDER — LIDOCAINE HCL/EPINEPHRINE/PF 2%-1:200K
VIAL (ML) INJECTION
Status: DISPENSED
Start: 2024-06-05